# Patient Record
Sex: FEMALE | Race: WHITE | NOT HISPANIC OR LATINO | Employment: OTHER | URBAN - METROPOLITAN AREA
[De-identification: names, ages, dates, MRNs, and addresses within clinical notes are randomized per-mention and may not be internally consistent; named-entity substitution may affect disease eponyms.]

---

## 2017-01-24 ENCOUNTER — ALLSCRIPTS OFFICE VISIT (OUTPATIENT)
Dept: OTHER | Facility: OTHER | Age: 57
End: 2017-01-24

## 2017-01-24 DIAGNOSIS — I10 ESSENTIAL (PRIMARY) HYPERTENSION: ICD-10-CM

## 2017-01-24 DIAGNOSIS — E55.9 VITAMIN D DEFICIENCY: ICD-10-CM

## 2018-01-11 NOTE — PROGRESS NOTES
Assessment    1  Well adult on routine health check (V70 0) (Z00 00)    Plan  Asthma    · Montelukast Sodium 10 MG Oral Tablet; Take 1 tablet by mouth at bedtime  Hypertension    · HydroCHLOROthiazide 12 5 MG Oral Capsule; TAKE 1 CAPSULE DAILY   · (1) COMPREHENSIVE METABOLIC PANEL; Status:Active; Requested FQX:[id number];    · (1) LIPID PANEL, FASTING; Status:Active; Requested DPE:[id number];    · (1) TSH; Status:Active; Requested UCR:[id number];   Low vitamin D level    · (1) VITAMIN D 25-HYDROXY; Status:Active; Requested ZGH:[id number];     Discussion/Summary  health maintenance visit Currently, she eats a healthy diet  cervical cancer screening is current Breast cancer screening: the risks and benefits of breast cancer screening were discussed, the patient declines breast cancer screening and DISCUSSED RISK AND BENEFIT-  Osteoporosis screening: the risks and benefits of osteoporosis screening were discussed  The immunizations are up to date  Advice and education were given regarding calcium supplements and vitamin D supplements  Patient discussion: discussed with the patient  OBTAIN LABS  IMM UP TO DATE  REFUSES MAMMOGRAM  FOLLOW UP 6 MONTHS  BP STABLE  LIPIDS STABLE    CHECK VIT D LEVEL- LAST DEXA NORMAL  Patient is able to Self-Care  Chief Complaint  PATIENT HERE FOR ANNUAL EXAM;      History of Present Illness  HM, Adult Female: The patient is being seen for a health maintenance evaluation  The last health maintenance visit was 12 month(s) ago  General Health:   Reproductive health: the patient is postmenopausal    Screening: cancer screening reviewed and current  metabolic screening reviewed and current  risk screening reviewed and current  HPI: PATIENT HERE FOR ANNUAL EXAM;      Review of Systems    Constitutional: No fever, no chills, feels well, no tiredness, no recent weight gain or weight loss, not feeling poorly and not feeling tired     Eyes: No complaints of eye pain, no red eyes, no eyesight problems, no discharge, no dry eyes, no itching of eyes, no eye pain, no eyesight problems and no purulent discharge from the eyes  ENT: no complaints of earache, no loss of hearing, no nose bleeds, no nasal discharge, no sore throat, no hoarseness and no earache  Cardiovascular: No complaints of slow heart rate, no fast heart rate, no chest pain, no palpitations, no leg claudication, no lower extremity edema, no chest pain, no intermittent leg claudication and no palpitations  Respiratory: No complaints of shortness of breath, no wheezing, no cough, no SOB on exertion, no orthopnea, no PND, no cough and no shortness of breath during exertion  Gastrointestinal: No complaints of abdominal pain, no constipation, no nausea or vomiting, no diarrhea, no bloody stools, no abdominal pain, no nausea, no constipation and no diarrhea  Genitourinary: No complaints of dysuria, no incontinence, no pelvic pain, no dysmenorrhea, no vaginal discharge or bleeding  Musculoskeletal: as noted in HPI and no arthralgias  Integumentary: No complaints of skin rash or lesions, no itching, no skin wounds, no breast pain or lump, no rashes and no skin lesions  Neurological: No complaints of headache, no confusion, no convulsions, no numbness, no dizziness or fainting, no tingling, no limb weakness, no difficulty walking, no headache, no confusion and no convulsions  Psychiatric: Not suicidal, no sleep disturbance, no anxiety or depression, no change in personality, no emotional problems and no sleep disturbances  Endocrine: No complaints of proptosis, no hot flashes, no muscle weakness, no deepening of the voice, no feelings of weakness and no proptosis  Hematologic/Lymphatic: No complaints of swollen glands, no swollen glands in the neck, does not bleed easily, does not bruise easily  Active Problems    1  Allergic rhinitis (477 9) (J30 9)   2  Asthma (493 90) (J45 909)   3  Edema (782 3) (R60 9)   4  Follow-up arranged for 6 months   5  Hypertension (401 9) (I10)   6  Left knee tendonitis (727 09) (M17 892)   7  Need for prophylactic vaccination and inoculation against influenza (V04 81) (Z23)   8  Well adult on routine health check (V70 0) (Z00 00)    Past Medical History    · History of Fracture Of The Patella (822 0)   · History of Right groin pain (789 09) (R10 30)    Surgical History    · History of Tonsillectomy    Family History  Mother    · Family history of Echo Mitral Valve Systolic Bileaflet Prolapse  Father    · Family history of Echo Mitral Valve Systolic Bileaflet Prolapse  Maternal Grandmother    · Family history of Hypertension (V17 49)    Social History    · Never A Smoker    Current Meds   1  Allegra Allergy 60 MG Oral Tablet; Take 1 tablet daily Recorded   2  Asmanex  MCG/ACT Inhalation Aerosol; Therapy: (Recorded:12Apr2016) to Recorded   3  B Complex Vitamins CAPS; Therapy: (Recorded:04Apr2013) to Recorded   4  Fish Oil OIL; Therapy: 68QTP7706 to Recorded   5  HydroCHLOROthiazide 12 5 MG Oral Capsule; TAKE 1 CAPSULE DAILY; Therapy: 14KSC5394 to (Evaluate:88Kmv5412)  Requested for: 46ZFQ6308; Last   Rx:71Crg6401 Ordered   6  Montelukast Sodium 10 MG Oral Tablet; Take 1 tablet by mouth at bedtime; Therapy: 91INN7875 to (Evaluate:95Kvm3257)  Requested for: 02VRO2651; Last   Rx:[id number] Ordered   7  ProAir  (90 Base) MCG/ACT Inhalation Aerosol Solution; INHALE 2 PUFFS   FOUR TIMES DAILY AS DIRECTED; Therapy: 76WFK7820 to (Evaluate:30Mar2017)  Requested for: 04Apr2016; Last   Rx:[id number] Ordered   8  Vitamin D TABS; Therapy: (Recorded:04Apr2013) to Recorded    Allergies    1  Serevent AERS    2  Latex   3   Banana    Vitals   Recorded: 36HID8352 01:03PM   Temperature 98 F   Heart Rate 74   Respiration 16   Systolic 893   Diastolic 72   Height 5 ft 7 in   Weight 228 lb 6 4 oz   BMI Calculated 35 77   BSA Calculated 2 14     Physical Exam    Constitutional   General appearance: No acute distress, well appearing and well nourished  WDWN FEMALE IN NAD  Eyes   Conjunctiva and lids: No swelling, erythema or discharge  Pupils and irises: Equal, round, reactive to light  Ears, Nose, Mouth, and Throat   External inspection of ears and nose: Normal     Otoscopic examination: Tympanic membranes translucent with normal light reflex  Canals patent without erythema  Hearing: Normal     Nasal mucosa, septum, and turbinates: Normal without edema or erythema  Lips, teeth, and gums: Normal, good dentition  Oropharynx: Normal with no erythema, edema, exudate or lesions  Inspection of the oropharynx showed fully visible tonsils, uvula and soft palate (Mallampati class 1)  Oral mucosa was moist, but was normal  The palate examination showed no abnormalities  The tongue was normal  The tonsils were normal    Neck   Neck: Supple, symmetric, trachea midline, no masses  Thyroid: Normal, no thyromegaly  NO NODULES  Pulmonary   Respiratory effort: No increased work of breathing or signs of respiratory distress  Percussion of chest: Normal     Auscultation of lungs: Clear to auscultation  Auscultation of the lungs revealed no expiratory wheezing, normal expiratory time and no inspiratory wheezing  no rales or crackles were heard bilaterally  no rhonchi  no friction rub  no wheezing  no diminished breath sounds  no bronchial breath sounds  Cardiovascular   Palpation of heart: Normal PMI, no thrills  Auscultation of heart: Normal rate and rhythm, normal S1 and S2, no murmurs  The heart rate was normal  The rhythm was regular  Heart sounds: normal S1, normal S2, no S3 and no S4  no murmurs were heard  Abdomen   Abdomen: Non-tender, no masses  Liver and spleen: No hepatomegaly or splenomegaly  Lymphatic   Palpation of lymph nodes in neck: No lymphadenopathy  Musculoskeletal   Gait and station: Normal     Digits and nails: Normal without clubbing or cyanosis  Skin   Palpation of skin and subcutaneous tissue: Normal turgor  Neurologic   Cranial nerves: Cranial nerves II-XII intact  Cortical function: Normal mental status  Reflexes: 2+ and symmetric  Sensation: No sensory loss  Coordination: Normal finger to nose and heel to shin  Psychiatric   Judgment and insight: Normal     Orientation to person, place, and time: Normal     Recent and remote memory: Intact  Mood and affect: Normal        Results/Data  PHQ-2 Adult Depression Screening 24Jan2017 01:07PM User, s     Test Name Result Flag Reference   PHQ-2 Adult Depression Score 0     Over the last two weeks, how often have you been bothered by any of the following problems? Little interest or pleasure in doing things: Not at all - 0  Feeling down, depressed, or hopeless: Not at all - 0   PHQ-2 Adult Depression Screening Negative         Signatures   Electronically signed by :  Dianelys Plunkett DO; Jan 24 2017  1:41PM EST                       (Author)

## 2018-01-14 VITALS
DIASTOLIC BLOOD PRESSURE: 72 MMHG | HEART RATE: 74 BPM | BODY MASS INDEX: 35.85 KG/M2 | WEIGHT: 228.4 LBS | HEIGHT: 67 IN | SYSTOLIC BLOOD PRESSURE: 110 MMHG | RESPIRATION RATE: 16 BRPM | TEMPERATURE: 98 F

## 2018-01-16 ENCOUNTER — ALLSCRIPTS OFFICE VISIT (OUTPATIENT)
Dept: OTHER | Facility: OTHER | Age: 58
End: 2018-01-16

## 2018-01-16 DIAGNOSIS — I10 ESSENTIAL (PRIMARY) HYPERTENSION: ICD-10-CM

## 2018-01-16 DIAGNOSIS — R60.9 EDEMA: ICD-10-CM

## 2018-01-16 DIAGNOSIS — E55.9 VITAMIN D DEFICIENCY: ICD-10-CM

## 2018-01-23 NOTE — PROGRESS NOTES
Assessment    1  Well adult on routine health check (V70 0) (Z00 00)   2  Vitamin D deficiency (268 9) (E55 9)   3  Hypertension (401 9) (I10)   4  Edema (782 3) (R60 9)   5  Excessive cerumen in right ear canal (380 4) (H61 21)    Plan  Edema    · (1) CBC/PLT/DIFF; Status:Active; Requested BVY:[id number];    · (1) COMPREHENSIVE METABOLIC PANEL; Status:Active; Requested UUS:[id number];    · (1) TSH; Status:Active; Requested AAY:[id number];   Edema, Hypertension    · (1) LIPID PANEL, FASTING; Status:Active; Requested HRJ:[id number];   Edema, Vitamin D deficiency    · (1) VITAMIN D 25-HYDROXY; Status:Active; Requested GYJ:[id number]; Excessive cerumen in right ear canal    · Removal Impacted Cerumen Using Irrigation / Lavage one or both ears - POC;  Status:Complete;   Done: 99EGH4930  Hypertension, Low vitamin D level    · (1) MAGNESIUM; Status:Active; Requested TAB:[id number];    · Follow-up visit in 6 months Evaluation and Treatment  Follow-up  Status: Hold For -  Scheduling  Requested for: 31OLL6954    Discussion/Summary  health maintenance visit healthy adult female Currently, she eats a healthy diet  the patient declines cervical cancer screening Breast cancer screening: the patient declines breast cancer screening  Colorectal cancer screening: the patient declines colorectal cancer screening and PT REFUSES  Screening lab work includes glucose, lipid profile, thyroid function testing and 25-hydroxyvitamin D  The immunizations are up to date  Advice and education were given regarding aerobic exercise, calcium supplements and vitamin D supplements  Patient discussion: discussed with the patient  Hepatitis C Screening: the patient was counseled on Hepatitis C screening  The patient declines Hepatitis C screening       OBTAIN LABS FOLLOW UP IN 6 MONTHS   BP STABLE  OCC ECTOPY - NO OTHER SX  REFUSES MAMMO AND COLONOSCOPY  CERUEMN REMOVED RIHGT EAR   FOLLOW UP 6 MONTHS  The patient was counseled regarding diagnostic results, instructions for management, risk factor reductions, prognosis, patient and family education, impressions, risks and benefits of treatment options, importance of compliance with treatment  Chief Complaint  PATIENT HERE FOR ANNUAL EXAM  SHE FEELS WELL        History of Present Illness  HM, Adult Female: The patient is being seen for a health maintenance evaluation  The last health maintenance visit was 12 month(s) ago  General Health: She has regular dental visits  She denies vision problems  Immunizations status: up to date  Lifestyle:  She consumes a diverse and healthy diet  She does not have any weight concerns  She exercises regularly  She does not use tobacco  She denies alcohol use  She denies drug use  Reproductive health: the patient is postmenopausal    Screening: cancer screening reviewed and current  metabolic screening reviewed and current  risk screening reviewed and current  HPI: PATIENT HERE FOR ANNUAL EXAM;   REFUSES MAMMOGRAM AND COLONOSCOPY   Colorectal Cancer Screening Prevention Pathway: The patient is being seen for PT REFUSES colorectal cancer screening  The patient is currently asymptomatic  Review of Systems    Constitutional: No fever, no chills, feels well, no tiredness, no recent weight gain or weight loss, not feeling poorly and not feeling tired  Eyes: No complaints of eye pain, no red eyes, no eyesight problems, no discharge, no dry eyes, no itching of eyes, no eye pain, no eyesight problems and no purulent discharge from the eyes  ENT: no complaints of earache, no loss of hearing, no nose bleeds, no nasal discharge, no sore throat, no hoarseness, no earache, no nosebleeds, no sore throat, no nasal discharge and no hoarseness     Cardiovascular: No complaints of slow heart rate, no fast heart rate, no chest pain, no palpitations, no leg claudication, no lower extremity edema, the heart rate was not slow, no chest pain, no intermittent leg claudication, the heart rate was not fast and no palpitations  Respiratory: No complaints of shortness of breath, no wheezing, no cough, no SOB on exertion, no orthopnea, no PND, no shortness of breath, no cough, no wheezing and no shortness of breath during exertion  Gastrointestinal: No complaints of abdominal pain, no constipation, no nausea or vomiting, no diarrhea, no bloody stools, no abdominal pain, no nausea, no constipation and no diarrhea  Genitourinary: No complaints of dysuria, no incontinence, no pelvic pain, no dysmenorrhea, no vaginal discharge or bleeding, no dysuria, no pelvic pain and no incontinence  Musculoskeletal: as noted in HPI and no arthralgias  Integumentary: No complaints of skin rash or lesions, no itching, no skin wounds, no breast pain or lump, no rashes and no skin lesions  Neurological: No complaints of headache, no confusion, no convulsions, no numbness, no dizziness or fainting, no tingling, no limb weakness, no difficulty walking, no headache, no confusion and no convulsions  Psychiatric: Not suicidal, no sleep disturbance, no anxiety or depression, no change in personality, no emotional problems and no sleep disturbances  Endocrine: No complaints of proptosis, no hot flashes, no muscle weakness, no deepening of the voice, no feelings of weakness and no proptosis  Hematologic/Lymphatic: No complaints of swollen glands, no swollen glands in the neck, does not bleed easily, does not bruise easily  ROS reviewed  Active Problems    1  Allergic rhinitis (477 9) (J30 9)   2  Asthma (493 90) (J45 909)   3  Edema (782 3) (R60 9)   4  Follow-up arranged for 6 months   5  Hypertension (401 9) (I10)   6  Left knee tendonitis (727 09) (M76 892)   7  Low vitamin D level (268 9) (E55 9)   8  Need for prophylactic vaccination and inoculation against influenza (V04 81) (Z23)   9  Vitamin D deficiency (268 9) (E55 9)   10   Well adult on routine health check (V70 0) (Z00 00)    Past Medical History    · History of Fracture Of The Patella (822 0)   · History of Right groin pain (789 09) (R10 31)    Surgical History    · History of Tonsillectomy    Family History  Mother    · Family history of Echo Mitral Valve Systolic Bileaflet Prolapse  Father    · Family history of Echo Mitral Valve Systolic Bileaflet Prolapse  Maternal Grandmother    · Family history of Hypertension (V17 49)    Social History    · Never A Smoker    Current Meds   1  Allegra Allergy 60 MG Oral Tablet; Take 1 tablet daily Recorded   2  Asmanex  MCG/ACT Inhalation Aerosol; INHALE 2 PUFFS Twice daily    Requested for: 57SIJ7344; Last Rx:15Jun2017 Ordered   3  B Complex Vitamins CAPS; Therapy: (Recorded:04Apr2013) to Recorded   4  Fish Oil OIL; Therapy: 62DKO0618 to Recorded   5  HydroCHLOROthiazide 12 5 MG Oral Capsule; TAKE 1 CAPSULE DAILY; Therapy: 47MPP3260 to (Evaluate:02Aug2018)  Requested for: 63Efa5371; Last   Rx:07Aug2017 Ordered   6  Montelukast Sodium 10 MG Oral Tablet; Take 1 tablet by mouth at bedtime; Therapy: 39UVT7736 to (Evaluate:15Oct2018)  Requested for: 57NCO1498; Last   Rx:20Oct2017 Ordered   7  ProAir  (90 Base) MCG/ACT Inhalation Aerosol Solution; INHALE 2 PUFFS   FOUR TIMES DAILY AS DIRECTED; Therapy: 22IZF1927 to (Evaluate:09Jun2018)  Requested for: 58MPT0221; Last   Rx:14Jun2017 Ordered   8  Vitamin D3 2000 UNIT Oral Tablet; Take 2000iu 4 days week and 4000iu 3 days week; Therapy: (Recorded:16Jan2018) to Recorded    Allergies    1  Serevent AERS    2  Latex   3  Banana    Vitals   Recorded: 22DKF9252 03:02PM   Temperature 97 7 F   Heart Rate 64   Respiration 16   Systolic 231   Diastolic 76   Height 5 ft 6 25 in   Weight 230 lb    BMI Calculated 36 84   BSA Calculated 2 13     Physical Exam    Constitutional   General appearance: No acute distress, well appearing and well nourished  WDWN FEMALE IN NAD  Eyes   Conjunctiva and lids: No swelling, erythema or discharge      Pupils and irises: Equal, round, reactive to light  Ears, Nose, Mouth, and Throat   External inspection of ears and nose: Normal     Otoscopic examination: Tympanic membranes translucent with normal light reflex  Canals patent without erythema  Hearing: Normal   DECEREASED HEARING RIGHT EAR  Nasal mucosa, septum, and turbinates: Normal without edema or erythema  CERYMEN RIGHT EAR=-    Lips, teeth, and gums: Normal, good dentition  Oropharynx: Normal with no erythema, edema, exudate or lesions  Inspection of the oropharynx showed fully visible tonsils, uvula and soft palate (Mallampati class 1)  Oral mucosa was moist, but was normal  The palate examination showed no abnormalities  The tongue was normal  The tonsils were normal    Neck   Neck: Supple, symmetric, trachea midline, no masses  Thyroid: Normal, no thyromegaly  NO NODULES  Pulmonary   Respiratory effort: No increased work of breathing or signs of respiratory distress  Percussion of chest: Normal     Auscultation of lungs: Clear to auscultation  Auscultation of the lungs revealed no expiratory wheezing, normal expiratory time and no inspiratory wheezing  no rales or crackles were heard bilaterally  no rhonchi  no friction rub  no wheezing  no diminished breath sounds  no bronchial breath sounds  Cardiovascular   Palpation of heart: Normal PMI, no thrills  Auscultation of heart: Normal rate and rhythm, normal S1 and S2, no murmurs  The heart rate was normal  The rhythm was regular  Heart sounds: normal S1, normal S2, no S3 and no S4  no murmurs were heard  Abdomen   Abdomen: Non-tender, no masses  Liver and spleen: No hepatomegaly or splenomegaly  Lymphatic   Palpation of lymph nodes in neck: No lymphadenopathy  Musculoskeletal   Gait and station: Normal     Digits and nails: Normal without clubbing or cyanosis  Skin   Palpation of skin and subcutaneous tissue: Normal turgor      Neurologic   Cranial nerves: Cranial nerves II-XII intact  Cortical function: Normal mental status  Reflexes: 2+ and symmetric  Sensation: No sensory loss  Coordination: Normal finger to nose and heel to shin  Psychiatric   Judgment and insight: Normal     Orientation to person, place, and time: Normal     Recent and remote memory: Intact  Mood and affect: Normal        Results/Data  PHQ-2 Adult Depression Screening 22QWB6514 03:05PM User, Ahs     Test Name Result Flag Reference   PHQ-2 Adult Depression Score 0     Over the last two weeks, how often have you been bothered by any of the following problems? Little interest or pleasure in doing things: Not at all - 0  Feeling down, depressed, or hopeless: Not at all - 0   PHQ-2 Adult Depression Screening Negative         Procedure    Procedure: cerumen removal  ear cleaning due to otorrhea  Indication: tympanic membrane(s) could not be visualized in the right ear  Prep: hydrogen peroxide was placed in the canal prior to the procedure  Procedure Note: The procedure was performed by the Provider  The ear was cleaned by using warm water irrigation  The procedure was successful  Post-Procedure:   Patient Status: the patient tolerated the procedure well  Complications: there were no complications  Signatures   Electronically signed by :  Dianelys Plunkett DO; Jan 16 2018  3:41PM EST                       (Author)

## 2018-01-24 VITALS
HEART RATE: 64 BPM | RESPIRATION RATE: 16 BRPM | BODY MASS INDEX: 36.96 KG/M2 | SYSTOLIC BLOOD PRESSURE: 118 MMHG | WEIGHT: 230 LBS | DIASTOLIC BLOOD PRESSURE: 76 MMHG | TEMPERATURE: 97.7 F | HEIGHT: 66 IN

## 2018-03-29 DIAGNOSIS — J45.909 UNCOMPLICATED ASTHMA, UNSPECIFIED ASTHMA SEVERITY, UNSPECIFIED WHETHER PERSISTENT: Primary | ICD-10-CM

## 2018-05-22 ENCOUNTER — APPOINTMENT (OUTPATIENT)
Dept: LAB | Facility: CLINIC | Age: 58
End: 2018-05-22
Payer: COMMERCIAL

## 2018-05-22 DIAGNOSIS — I10 ESSENTIAL (PRIMARY) HYPERTENSION: ICD-10-CM

## 2018-05-22 DIAGNOSIS — E55.9 VITAMIN D DEFICIENCY: ICD-10-CM

## 2018-05-22 DIAGNOSIS — R60.9 EDEMA: ICD-10-CM

## 2018-05-22 LAB
25(OH)D3 SERPL-MCNC: 23.2 NG/ML (ref 30–100)
ALBUMIN SERPL BCP-MCNC: 3.6 G/DL (ref 3.5–5)
ALP SERPL-CCNC: 59 U/L (ref 46–116)
ALT SERPL W P-5'-P-CCNC: 41 U/L (ref 12–78)
ANION GAP SERPL CALCULATED.3IONS-SCNC: 4 MMOL/L (ref 4–13)
AST SERPL W P-5'-P-CCNC: 22 U/L (ref 5–45)
BASOPHILS # BLD AUTO: 0.02 THOUSANDS/ΜL (ref 0–0.1)
BASOPHILS NFR BLD AUTO: 0 % (ref 0–1)
BILIRUB SERPL-MCNC: 0.79 MG/DL (ref 0.2–1)
BUN SERPL-MCNC: 23 MG/DL (ref 5–25)
CALCIUM SERPL-MCNC: 9.4 MG/DL (ref 8.3–10.1)
CHLORIDE SERPL-SCNC: 105 MMOL/L (ref 100–108)
CHOLEST SERPL-MCNC: 220 MG/DL (ref 50–200)
CO2 SERPL-SCNC: 29 MMOL/L (ref 21–32)
CREAT SERPL-MCNC: 0.94 MG/DL (ref 0.6–1.3)
EOSINOPHIL # BLD AUTO: 0.13 THOUSAND/ΜL (ref 0–0.61)
EOSINOPHIL NFR BLD AUTO: 2 % (ref 0–6)
ERYTHROCYTE [DISTWIDTH] IN BLOOD BY AUTOMATED COUNT: 14.8 % (ref 11.6–15.1)
GFR SERPL CREATININE-BSD FRML MDRD: 68 ML/MIN/1.73SQ M
GLUCOSE P FAST SERPL-MCNC: 94 MG/DL (ref 65–99)
HCT VFR BLD AUTO: 44.3 % (ref 34.8–46.1)
HDLC SERPL-MCNC: 51 MG/DL (ref 40–60)
HGB BLD-MCNC: 14.1 G/DL (ref 11.5–15.4)
LDLC SERPL CALC-MCNC: 138 MG/DL (ref 0–100)
LYMPHOCYTES # BLD AUTO: 2.46 THOUSANDS/ΜL (ref 0.6–4.47)
LYMPHOCYTES NFR BLD AUTO: 41 % (ref 14–44)
MAGNESIUM SERPL-MCNC: 2.4 MG/DL (ref 1.6–2.6)
MCH RBC QN AUTO: 31.4 PG (ref 26.8–34.3)
MCHC RBC AUTO-ENTMCNC: 31.8 G/DL (ref 31.4–37.4)
MCV RBC AUTO: 99 FL (ref 82–98)
MONOCYTES # BLD AUTO: 0.49 THOUSAND/ΜL (ref 0.17–1.22)
MONOCYTES NFR BLD AUTO: 8 % (ref 4–12)
NEUTROPHILS # BLD AUTO: 2.96 THOUSANDS/ΜL (ref 1.85–7.62)
NEUTS SEG NFR BLD AUTO: 49 % (ref 43–75)
NONHDLC SERPL-MCNC: 169 MG/DL
NRBC BLD AUTO-RTO: 0 /100 WBCS
PLATELET # BLD AUTO: 309 THOUSANDS/UL (ref 149–390)
PMV BLD AUTO: 10.2 FL (ref 8.9–12.7)
POTASSIUM SERPL-SCNC: 3.8 MMOL/L (ref 3.5–5.3)
PROT SERPL-MCNC: 7.3 G/DL (ref 6.4–8.2)
RBC # BLD AUTO: 4.49 MILLION/UL (ref 3.81–5.12)
SODIUM SERPL-SCNC: 138 MMOL/L (ref 136–145)
TRIGL SERPL-MCNC: 154 MG/DL
TSH SERPL DL<=0.05 MIU/L-ACNC: 2.19 UIU/ML (ref 0.36–3.74)
WBC # BLD AUTO: 6.07 THOUSAND/UL (ref 4.31–10.16)

## 2018-05-22 PROCEDURE — 83735 ASSAY OF MAGNESIUM: CPT

## 2018-05-22 PROCEDURE — 80061 LIPID PANEL: CPT

## 2018-05-22 PROCEDURE — 80053 COMPREHEN METABOLIC PANEL: CPT

## 2018-05-22 PROCEDURE — 84443 ASSAY THYROID STIM HORMONE: CPT

## 2018-05-22 PROCEDURE — 82306 VITAMIN D 25 HYDROXY: CPT

## 2018-05-22 PROCEDURE — 36415 COLL VENOUS BLD VENIPUNCTURE: CPT

## 2018-05-22 PROCEDURE — 85025 COMPLETE CBC W/AUTO DIFF WBC: CPT

## 2018-05-29 ENCOUNTER — TELEPHONE (OUTPATIENT)
Dept: FAMILY MEDICINE CLINIC | Facility: CLINIC | Age: 58
End: 2018-05-29

## 2018-05-29 NOTE — TELEPHONE ENCOUNTER
PATIENT RECEIVED LAB RESULTS IN THE MAIL  INSTRUCTIONS SAID TO TAKE VITAMIN D 2000 IU DAILY  PATIENT SAID SHE IS CURRENTLY TAKING 2000 IU X 4 DAYS & 4000 IU X 3 DAY  SHE IS WONDERING IF SHE START TAKING A RX DOSE OF VITAMIN D? OR JUST KEEP INCREASING HER OTC DOSE? [facility] [address]

## 2018-06-05 DIAGNOSIS — J45.909 UNCOMPLICATED ASTHMA, UNSPECIFIED ASTHMA SEVERITY, UNSPECIFIED WHETHER PERSISTENT: ICD-10-CM

## 2018-06-06 DIAGNOSIS — J45.909 UNCOMPLICATED ASTHMA, UNSPECIFIED ASTHMA SEVERITY, UNSPECIFIED WHETHER PERSISTENT: ICD-10-CM

## 2018-06-06 RX ORDER — MOMETASONE FUROATE 220 UG/1
2 INHALANT RESPIRATORY (INHALATION) DAILY
Qty: 1 INHALER | Refills: 3 | Status: SHIPPED | OUTPATIENT
Start: 2018-06-06 | End: 2018-08-13 | Stop reason: SDUPTHER

## 2018-07-27 DIAGNOSIS — I10 ESSENTIAL HYPERTENSION: Primary | ICD-10-CM

## 2018-07-27 RX ORDER — HYDROCHLOROTHIAZIDE 12.5 MG/1
CAPSULE, GELATIN COATED ORAL
Qty: 90 CAPSULE | Refills: 3 | Status: SHIPPED | OUTPATIENT
Start: 2018-07-27 | End: 2019-07-08 | Stop reason: SDUPTHER

## 2018-08-13 ENCOUNTER — OFFICE VISIT (OUTPATIENT)
Dept: FAMILY MEDICINE CLINIC | Facility: CLINIC | Age: 58
End: 2018-08-13
Payer: COMMERCIAL

## 2018-08-13 VITALS
DIASTOLIC BLOOD PRESSURE: 82 MMHG | TEMPERATURE: 98.6 F | HEIGHT: 67 IN | HEART RATE: 76 BPM | SYSTOLIC BLOOD PRESSURE: 124 MMHG | WEIGHT: 230 LBS | BODY MASS INDEX: 36.1 KG/M2

## 2018-08-13 DIAGNOSIS — J45.20 MILD INTERMITTENT REACTIVE AIRWAY DISEASE WITHOUT COMPLICATION: ICD-10-CM

## 2018-08-13 DIAGNOSIS — I10 ESSENTIAL HYPERTENSION: Primary | ICD-10-CM

## 2018-08-13 DIAGNOSIS — J45.909 UNCOMPLICATED ASTHMA, UNSPECIFIED ASTHMA SEVERITY, UNSPECIFIED WHETHER PERSISTENT: ICD-10-CM

## 2018-08-13 DIAGNOSIS — E55.9 VITAMIN D DEFICIENCY: ICD-10-CM

## 2018-08-13 PROCEDURE — 3008F BODY MASS INDEX DOCD: CPT | Performed by: INTERNAL MEDICINE

## 2018-08-13 PROCEDURE — 3079F DIAST BP 80-89 MM HG: CPT | Performed by: INTERNAL MEDICINE

## 2018-08-13 PROCEDURE — 1036F TOBACCO NON-USER: CPT | Performed by: INTERNAL MEDICINE

## 2018-08-13 PROCEDURE — 99213 OFFICE O/P EST LOW 20 MIN: CPT | Performed by: INTERNAL MEDICINE

## 2018-08-13 PROCEDURE — 3074F SYST BP LT 130 MM HG: CPT | Performed by: INTERNAL MEDICINE

## 2018-08-13 RX ORDER — ALBUTEROL SULFATE 90 UG/1
2 AEROSOL, METERED RESPIRATORY (INHALATION) 4 TIMES DAILY
COMMUNITY
Start: 2011-05-26 | End: 2018-11-29 | Stop reason: SDUPTHER

## 2018-08-13 RX ORDER — CHLORAL HYDRATE 500 MG
1 CAPSULE ORAL DAILY
COMMUNITY
Start: 2013-04-04

## 2018-08-13 RX ORDER — FEXOFENADINE HYDROCHLORIDE 60 MG/1
1 TABLET, FILM COATED ORAL DAILY
COMMUNITY

## 2018-08-13 RX ORDER — MONTELUKAST SODIUM 10 MG/1
1 TABLET ORAL
COMMUNITY
Start: 2014-04-07 | End: 2018-11-03 | Stop reason: SDUPTHER

## 2018-08-13 RX ORDER — VITAMIN B COMPLEX
1 CAPSULE ORAL DAILY
COMMUNITY

## 2018-08-13 RX ORDER — ACETAMINOPHEN 160 MG
5000 TABLET,DISINTEGRATING ORAL DAILY
COMMUNITY

## 2018-08-13 NOTE — PROGRESS NOTES
ASSESSMENT and PLAN:  Manav López is a 62 y o  female with:   Problem List Items Addressed This Visit     Hypertension - Primary     Stable bp  Stable labs  Check labs in 6 months          Relevant Orders    Comprehensive metabolic panel    Lipid panel    Vitamin D deficiency     Patient taking vitamin d otc  Follow up in 6-9 months          Relevant Orders    Vitamin D 25 hydroxy    Reactive airway disease without complication     Continue current meds  Follow up                SUBJECTIVE:  Manav López is a 62 y o  female who presents today with a chief complaint of Hypertension (4 month follow up) and Asthma    Patient here for follow up; Her bp is stable  She refuses mammogram and colonoscopy  Review of Systems   Constitutional: Negative  HENT: Negative  Eyes: Negative  Respiratory: Negative  Cardiovascular: Negative  Gastrointestinal: Negative  Endocrine: Negative  Genitourinary: Negative  Musculoskeletal: Negative  Skin: Negative  Allergic/Immunologic: Negative  Neurological: Negative  Hematological: Negative  Psychiatric/Behavioral: Negative  I have reviewed the patient's PMH, Social History, Medication List and Allergies  OBJECTIVE:  /82 (BP Location: Left arm, Patient Position: Sitting, Cuff Size: Large)   Pulse 76   Temp 98 6 °F (37 °C)   Ht 5' 6 75" (1 695 m)   Wt 104 kg (230 lb)   Breastfeeding? No   BMI 36 29 kg/m²   Physical Exam   Constitutional: She is oriented to person, place, and time  She appears well-developed and well-nourished  HENT:   Head: Normocephalic and atraumatic  Right Ear: External ear normal    Left Ear: External ear normal    Nose: Nose normal    Mouth/Throat: Oropharynx is clear and moist    Eyes: Conjunctivae and EOM are normal  Pupils are equal, round, and reactive to light  Neck: Normal range of motion  Neck supple  No JVD present  No tracheal deviation present  No thyromegaly present  Cardiovascular: Normal rate, regular rhythm, normal heart sounds and intact distal pulses  No murmur heard  Pulmonary/Chest: Effort normal and breath sounds normal  No respiratory distress  She has no wheezes  Abdominal: Soft  Bowel sounds are normal  She exhibits no distension  There is no tenderness  Musculoskeletal: Normal range of motion  She exhibits no edema, tenderness or deformity  Neurological: She is alert and oriented to person, place, and time  She has normal reflexes  No cranial nerve deficit  Coordination normal    Skin: Skin is warm and dry  No rash noted  No erythema  Right great toe with loss of nail, deformity of nail and dry skin lateral edge    Psychiatric: She has a normal mood and affect  Her behavior is normal  Thought content normal    Nursing note and vitals reviewed

## 2018-08-13 NOTE — PATIENT INSTRUCTIONS
Add clotrimazole and kendell aid to right great toe- put on at night- slather it on-  Will take 3 weeks   Labs in march or april

## 2018-11-03 DIAGNOSIS — J45.909 ASTHMA, UNSPECIFIED ASTHMA SEVERITY, UNSPECIFIED WHETHER COMPLICATED, UNSPECIFIED WHETHER PERSISTENT: Primary | ICD-10-CM

## 2018-11-03 RX ORDER — MONTELUKAST SODIUM 10 MG/1
TABLET ORAL
Qty: 90 TABLET | Refills: 1 | Status: SHIPPED | OUTPATIENT
Start: 2018-11-03 | End: 2019-04-28 | Stop reason: SDUPTHER

## 2018-11-15 ENCOUNTER — IMMUNIZATION (OUTPATIENT)
Dept: FAMILY MEDICINE CLINIC | Facility: CLINIC | Age: 58
End: 2018-11-15
Payer: COMMERCIAL

## 2018-11-15 DIAGNOSIS — Z23 NEED FOR IMMUNIZATION AGAINST INFLUENZA: Primary | ICD-10-CM

## 2018-11-15 PROCEDURE — 90682 RIV4 VACC RECOMBINANT DNA IM: CPT | Performed by: FAMILY MEDICINE

## 2018-11-15 PROCEDURE — 90471 IMMUNIZATION ADMIN: CPT | Performed by: FAMILY MEDICINE

## 2018-11-29 DIAGNOSIS — J45.20 MILD INTERMITTENT ASTHMA WITHOUT COMPLICATION: Primary | ICD-10-CM

## 2018-11-29 RX ORDER — ALBUTEROL SULFATE 90 UG/1
2 AEROSOL, METERED RESPIRATORY (INHALATION) 4 TIMES DAILY PRN
Qty: 1 INHALER | Refills: 5 | Status: SHIPPED | OUTPATIENT
Start: 2018-11-29 | End: 2019-12-02 | Stop reason: SDUPTHER

## 2019-01-25 ENCOUNTER — TELEPHONE (OUTPATIENT)
Dept: FAMILY MEDICINE CLINIC | Facility: CLINIC | Age: 59
End: 2019-01-25

## 2019-01-25 DIAGNOSIS — J06.9 UPPER RESPIRATORY TRACT INFECTION, UNSPECIFIED TYPE: Primary | ICD-10-CM

## 2019-01-25 RX ORDER — AZITHROMYCIN 250 MG/1
TABLET, FILM COATED ORAL
Qty: 6 TABLET | Refills: 0 | Status: SHIPPED | OUTPATIENT
Start: 2019-01-25 | End: 2019-01-29

## 2019-01-25 NOTE — TELEPHONE ENCOUNTER
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

## 2019-04-22 ENCOUNTER — APPOINTMENT (OUTPATIENT)
Dept: LAB | Facility: CLINIC | Age: 59
End: 2019-04-22
Payer: COMMERCIAL

## 2019-04-22 DIAGNOSIS — E55.9 VITAMIN D DEFICIENCY: ICD-10-CM

## 2019-04-22 DIAGNOSIS — I10 ESSENTIAL HYPERTENSION: ICD-10-CM

## 2019-04-22 LAB
25(OH)D3 SERPL-MCNC: 36.7 NG/ML (ref 30–100)
ALBUMIN SERPL BCP-MCNC: 3.6 G/DL (ref 3.5–5)
ALP SERPL-CCNC: 62 U/L (ref 46–116)
ALT SERPL W P-5'-P-CCNC: 43 U/L (ref 12–78)
ANION GAP SERPL CALCULATED.3IONS-SCNC: 6 MMOL/L (ref 4–13)
AST SERPL W P-5'-P-CCNC: 22 U/L (ref 5–45)
BILIRUB SERPL-MCNC: 0.89 MG/DL (ref 0.2–1)
BUN SERPL-MCNC: 24 MG/DL (ref 5–25)
CALCIUM SERPL-MCNC: 9.4 MG/DL (ref 8.3–10.1)
CHLORIDE SERPL-SCNC: 104 MMOL/L (ref 100–108)
CHOLEST SERPL-MCNC: 233 MG/DL (ref 50–200)
CO2 SERPL-SCNC: 27 MMOL/L (ref 21–32)
CREAT SERPL-MCNC: 0.98 MG/DL (ref 0.6–1.3)
GFR SERPL CREATININE-BSD FRML MDRD: 64 ML/MIN/1.73SQ M
GLUCOSE P FAST SERPL-MCNC: 87 MG/DL (ref 65–99)
HDLC SERPL-MCNC: 50 MG/DL (ref 40–60)
LDLC SERPL CALC-MCNC: 148 MG/DL (ref 0–100)
NONHDLC SERPL-MCNC: 183 MG/DL
POTASSIUM SERPL-SCNC: 3.8 MMOL/L (ref 3.5–5.3)
PROT SERPL-MCNC: 7.3 G/DL (ref 6.4–8.2)
SODIUM SERPL-SCNC: 137 MMOL/L (ref 136–145)
TRIGL SERPL-MCNC: 173 MG/DL

## 2019-04-22 PROCEDURE — 80061 LIPID PANEL: CPT

## 2019-04-22 PROCEDURE — 36415 COLL VENOUS BLD VENIPUNCTURE: CPT

## 2019-04-22 PROCEDURE — 82306 VITAMIN D 25 HYDROXY: CPT

## 2019-04-22 PROCEDURE — 80053 COMPREHEN METABOLIC PANEL: CPT

## 2019-04-28 DIAGNOSIS — J45.909 ASTHMA, UNSPECIFIED ASTHMA SEVERITY, UNSPECIFIED WHETHER COMPLICATED, UNSPECIFIED WHETHER PERSISTENT: ICD-10-CM

## 2019-04-28 RX ORDER — MONTELUKAST SODIUM 10 MG/1
TABLET ORAL
Qty: 30 TABLET | Refills: 0 | Status: SHIPPED | OUTPATIENT
Start: 2019-04-28 | End: 2019-05-28 | Stop reason: SDUPTHER

## 2019-05-07 ENCOUNTER — OFFICE VISIT (OUTPATIENT)
Dept: FAMILY MEDICINE CLINIC | Facility: CLINIC | Age: 59
End: 2019-05-07
Payer: COMMERCIAL

## 2019-05-07 VITALS
HEIGHT: 67 IN | DIASTOLIC BLOOD PRESSURE: 82 MMHG | SYSTOLIC BLOOD PRESSURE: 122 MMHG | BODY MASS INDEX: 36.1 KG/M2 | OXYGEN SATURATION: 97 % | WEIGHT: 230 LBS | RESPIRATION RATE: 17 BRPM | HEART RATE: 74 BPM

## 2019-05-07 DIAGNOSIS — E78.2 MIXED HYPERLIPIDEMIA: ICD-10-CM

## 2019-05-07 DIAGNOSIS — E55.9 VITAMIN D DEFICIENCY: ICD-10-CM

## 2019-05-07 DIAGNOSIS — J30.1 SEASONAL ALLERGIC RHINITIS DUE TO POLLEN: ICD-10-CM

## 2019-05-07 DIAGNOSIS — I10 ESSENTIAL HYPERTENSION: Primary | ICD-10-CM

## 2019-05-07 DIAGNOSIS — J45.40 MODERATE PERSISTENT ASTHMA WITHOUT COMPLICATION: ICD-10-CM

## 2019-05-07 DIAGNOSIS — E66.9 OBESITY (BMI 30-39.9): ICD-10-CM

## 2019-05-07 PROBLEM — J45.909 REACTIVE AIRWAY DISEASE WITHOUT COMPLICATION: Status: RESOLVED | Noted: 2018-08-13 | Resolved: 2019-05-07

## 2019-05-07 PROCEDURE — 3074F SYST BP LT 130 MM HG: CPT | Performed by: FAMILY MEDICINE

## 2019-05-07 PROCEDURE — 1111F DSCHRG MED/CURRENT MED MERGE: CPT | Performed by: FAMILY MEDICINE

## 2019-05-07 PROCEDURE — 3079F DIAST BP 80-89 MM HG: CPT | Performed by: FAMILY MEDICINE

## 2019-05-07 PROCEDURE — 3008F BODY MASS INDEX DOCD: CPT | Performed by: FAMILY MEDICINE

## 2019-05-07 PROCEDURE — 99214 OFFICE O/P EST MOD 30 MIN: CPT | Performed by: FAMILY MEDICINE

## 2019-05-07 PROCEDURE — 1036F TOBACCO NON-USER: CPT | Performed by: FAMILY MEDICINE

## 2019-05-28 DIAGNOSIS — J45.909 ASTHMA, UNSPECIFIED ASTHMA SEVERITY, UNSPECIFIED WHETHER COMPLICATED, UNSPECIFIED WHETHER PERSISTENT: ICD-10-CM

## 2019-05-28 RX ORDER — MONTELUKAST SODIUM 10 MG/1
TABLET ORAL
Qty: 30 TABLET | Refills: 5 | Status: SHIPPED | OUTPATIENT
Start: 2019-05-28 | End: 2019-11-24 | Stop reason: SDUPTHER

## 2019-07-08 DIAGNOSIS — I10 ESSENTIAL HYPERTENSION: ICD-10-CM

## 2019-07-08 RX ORDER — HYDROCHLOROTHIAZIDE 12.5 MG/1
CAPSULE, GELATIN COATED ORAL
Qty: 90 CAPSULE | Refills: 0 | Status: SHIPPED | OUTPATIENT
Start: 2019-07-08 | End: 2019-10-01 | Stop reason: SDUPTHER

## 2019-08-20 DIAGNOSIS — J45.909 UNCOMPLICATED ASTHMA, UNSPECIFIED ASTHMA SEVERITY, UNSPECIFIED WHETHER PERSISTENT: ICD-10-CM

## 2019-10-01 DIAGNOSIS — I10 ESSENTIAL HYPERTENSION: ICD-10-CM

## 2019-10-01 RX ORDER — HYDROCHLOROTHIAZIDE 12.5 MG/1
CAPSULE, GELATIN COATED ORAL
Qty: 90 CAPSULE | Refills: 1 | Status: SHIPPED | OUTPATIENT
Start: 2019-10-01 | End: 2020-03-30

## 2019-11-14 ENCOUNTER — APPOINTMENT (OUTPATIENT)
Dept: LAB | Facility: CLINIC | Age: 59
End: 2019-11-14
Payer: COMMERCIAL

## 2019-11-14 LAB
25(OH)D3 SERPL-MCNC: 30.2 NG/ML (ref 30–100)
ALBUMIN SERPL BCP-MCNC: 3.6 G/DL (ref 3.5–5)
ALP SERPL-CCNC: 66 U/L (ref 46–116)
ALT SERPL W P-5'-P-CCNC: 58 U/L (ref 12–78)
ANION GAP SERPL CALCULATED.3IONS-SCNC: 2 MMOL/L (ref 4–13)
AST SERPL W P-5'-P-CCNC: 52 U/L (ref 5–45)
BASOPHILS # BLD AUTO: 0.04 THOUSANDS/ΜL (ref 0–0.1)
BASOPHILS NFR BLD AUTO: 1 % (ref 0–1)
BILIRUB SERPL-MCNC: 0.82 MG/DL (ref 0.2–1)
BUN SERPL-MCNC: 24 MG/DL (ref 5–25)
CALCIUM SERPL-MCNC: 9.8 MG/DL (ref 8.3–10.1)
CHLORIDE SERPL-SCNC: 106 MMOL/L (ref 100–108)
CHOLEST SERPL-MCNC: 235 MG/DL (ref 50–200)
CO2 SERPL-SCNC: 31 MMOL/L (ref 21–32)
CREAT SERPL-MCNC: 0.94 MG/DL (ref 0.6–1.3)
EOSINOPHIL # BLD AUTO: 0.11 THOUSAND/ΜL (ref 0–0.61)
EOSINOPHIL NFR BLD AUTO: 2 % (ref 0–6)
ERYTHROCYTE [DISTWIDTH] IN BLOOD BY AUTOMATED COUNT: 14.3 % (ref 11.6–15.1)
GFR SERPL CREATININE-BSD FRML MDRD: 67 ML/MIN/1.73SQ M
GLUCOSE P FAST SERPL-MCNC: 95 MG/DL (ref 65–99)
HCT VFR BLD AUTO: 46.6 % (ref 34.8–46.1)
HDLC SERPL-MCNC: 60 MG/DL
HGB BLD-MCNC: 14.8 G/DL (ref 11.5–15.4)
IMM GRANULOCYTES # BLD AUTO: 0.01 THOUSAND/UL (ref 0–0.2)
IMM GRANULOCYTES NFR BLD AUTO: 0 % (ref 0–2)
LDLC SERPL CALC-MCNC: 139 MG/DL (ref 0–100)
LYMPHOCYTES # BLD AUTO: 1.98 THOUSANDS/ΜL (ref 0.6–4.47)
LYMPHOCYTES NFR BLD AUTO: 30 % (ref 14–44)
MCH RBC QN AUTO: 31.8 PG (ref 26.8–34.3)
MCHC RBC AUTO-ENTMCNC: 31.8 G/DL (ref 31.4–37.4)
MCV RBC AUTO: 100 FL (ref 82–98)
MONOCYTES # BLD AUTO: 0.55 THOUSAND/ΜL (ref 0.17–1.22)
MONOCYTES NFR BLD AUTO: 8 % (ref 4–12)
NEUTROPHILS # BLD AUTO: 3.89 THOUSANDS/ΜL (ref 1.85–7.62)
NEUTS SEG NFR BLD AUTO: 59 % (ref 43–75)
NONHDLC SERPL-MCNC: 175 MG/DL
NRBC BLD AUTO-RTO: 0 /100 WBCS
PLATELET # BLD AUTO: 326 THOUSANDS/UL (ref 149–390)
PMV BLD AUTO: 10.3 FL (ref 8.9–12.7)
POTASSIUM SERPL-SCNC: 3.9 MMOL/L (ref 3.5–5.3)
PROT SERPL-MCNC: 7.7 G/DL (ref 6.4–8.2)
RBC # BLD AUTO: 4.66 MILLION/UL (ref 3.81–5.12)
SODIUM SERPL-SCNC: 139 MMOL/L (ref 136–145)
TRIGL SERPL-MCNC: 179 MG/DL
TSH SERPL DL<=0.05 MIU/L-ACNC: 2.53 UIU/ML (ref 0.36–3.74)
WBC # BLD AUTO: 6.58 THOUSAND/UL (ref 4.31–10.16)

## 2019-11-14 PROCEDURE — 85025 COMPLETE CBC W/AUTO DIFF WBC: CPT | Performed by: FAMILY MEDICINE

## 2019-11-14 PROCEDURE — 84443 ASSAY THYROID STIM HORMONE: CPT | Performed by: FAMILY MEDICINE

## 2019-11-14 PROCEDURE — 36415 COLL VENOUS BLD VENIPUNCTURE: CPT | Performed by: FAMILY MEDICINE

## 2019-11-14 PROCEDURE — 82306 VITAMIN D 25 HYDROXY: CPT | Performed by: FAMILY MEDICINE

## 2019-11-14 PROCEDURE — 80053 COMPREHEN METABOLIC PANEL: CPT | Performed by: FAMILY MEDICINE

## 2019-11-14 PROCEDURE — 80061 LIPID PANEL: CPT | Performed by: FAMILY MEDICINE

## 2019-11-24 DIAGNOSIS — J45.909 ASTHMA, UNSPECIFIED ASTHMA SEVERITY, UNSPECIFIED WHETHER COMPLICATED, UNSPECIFIED WHETHER PERSISTENT: ICD-10-CM

## 2019-11-24 RX ORDER — MONTELUKAST SODIUM 10 MG/1
TABLET ORAL
Qty: 90 TABLET | Refills: 1 | Status: SHIPPED | OUTPATIENT
Start: 2019-11-24 | End: 2020-05-18

## 2019-12-01 NOTE — PROGRESS NOTES
Assessment/Plan:    Brown Dyson was seen today for follow-up  Diagnoses and all orders for this visit:    Essential hypertension  -     CBC and differential  -     Comprehensive metabolic panel    Moderate persistent asthma without complication    Mixed hyperlipidemia  -     Lipid panel    Elevated LFTs  -     Hepatic function panel; Future    Vitamin D deficiency  -     Vitamin D 25 hydroxy    Seasonal allergic rhinitis due to pollen    Need for immunization against influenza  -     influenza vaccine, [id number], quadrivalent, recombinant, PF, 0 5 mL, for patients 18 yr+ (FLUBLOK)    Mild intermittent asthma without complication  -     albuterol (PROAIR HFA) 90 mcg/act inhaler; Inhale 2 puffs 4 (four) times a day as needed for wheezing          Continue with current medications  No indication for statin therapy at this time  Flu vaccine today  Repeat LFTs in 4-6 weeks  OV 6 months with labs at that time  She is not interested in a mammogram or colonoscopy  The 10-year ASCVD risk score (Stella Dolan et al , 2013) is: 4%    Values used to calculate the score:      Age: 61 years      Sex: Female      Is Non- : No      Diabetic: No      Tobacco smoker: No      Systolic Blood Pressure: 840 mmHg      Is BP treated: Yes      HDL Cholesterol: 60 mg/dL      Total Cholesterol: 235 mg/dL     Patient ID: Emmanuel Peraza is a 61 y o  female  Hypertension   This is a chronic problem  The current episode started more than 1 year ago  The problem is unchanged  The problem is controlled  Associated symptoms include peripheral edema  Pertinent negatives include no anxiety, blurred vision, chest pain, headaches, malaise/fatigue, neck pain, orthopnea, palpitations, PND, shortness of breath or sweats  Risk factors for coronary artery disease include family history, obesity and post-menopausal state  There are no compliance problems          The following portions of the patient's history were reviewed and updated as appropriate: allergies, current medications, past family history, past medical history, past social history, past surgical history and problem list     Review of Systems   Constitutional: Negative for appetite change, chills, fatigue, fever, malaise/fatigue and unexpected weight change  HENT: Negative for congestion, ear pain, rhinorrhea, sore throat and trouble swallowing  Eyes: Negative for blurred vision and visual disturbance  Respiratory: Negative for cough, shortness of breath and wheezing  Asthma stable on daily Asmanex and Singulair 10 mg daily  She uses as needed ProAir generally once a day no nighttime symptoms   Cardiovascular: Negative for chest pain, palpitations, orthopnea, leg swelling and PND  Gastrointestinal: Negative for abdominal pain, blood in stool, constipation, diarrhea, nausea and vomiting  No prior colonoscopy  11/2019 LFTs normal except for AST 52  Endocrine:        Vitamin-D deficiency on vitamin D 4,000 units daily 11/2019 vitamin-D 30 2  2013 DEXA scan normal     Genitourinary: Negative for difficulty urinating  Musculoskeletal: Negative for arthralgias, myalgias and neck pain  Skin: Negative for rash  Allergic/Immunologic: Positive for environmental allergies  Seasonal allergies on Allegra daily  No prior allergy testing   Neurological: Negative for dizziness, weakness and headaches  Hematological: Negative for adenopathy  Does not bruise/bleed easily  Psychiatric/Behavioral: Negative for dysphoric mood and sleep disturbance  Objective:      /78   Pulse 88   Temp 98 2 °F (36 8 °C)   Resp 16   Ht 5' 6 75" (1 695 m)   Wt 106 kg (233 lb)   BMI 36 77 kg/m²          Physical Exam   Constitutional: She is oriented to person, place, and time  She appears well-developed and well-nourished  No distress  HENT:   Mouth/Throat: Oropharynx is clear and moist and mucous membranes are normal  No oral lesions   Normal dentition  Eyes: Pupils are equal, round, and reactive to light  Conjunctivae and EOM are normal  No scleral icterus  Neck: No JVD present  Carotid bruit is not present  No tracheal deviation present  No thyroid mass and no thyromegaly present  Cardiovascular: Normal rate, regular rhythm and normal heart sounds  Exam reveals no gallop  No murmur heard  Pulmonary/Chest: Effort normal and breath sounds normal  No respiratory distress  She has no wheezes  She has no rales  Abdominal: Soft  Bowel sounds are normal  She exhibits no distension, no abdominal bruit and no mass  There is no hepatosplenomegaly  There is no tenderness  There is no rebound and no guarding  Musculoskeletal: Normal range of motion  She exhibits no edema or deformity  Lymphadenopathy:     She has no cervical adenopathy  Neurological: She is alert and oriented to person, place, and time  She displays normal reflexes  No cranial nerve deficit  Skin: No rash noted  No cyanosis  Nails show no clubbing  Psychiatric: She has a normal mood and affect  Nursing note and vitals reviewed  AST   Date Value Ref Range Status   11/14/2019 52 (H) 5 - 45 U/L Final     Comment:       Specimen collection should occur prior to Sulfasalazine administration due to the potential for falsely depressed results  04/22/2019 22 5 - 45 U/L Final     Comment:       Specimen collection should occur prior to Sulfasalazine administration due to the potential for falsely depressed results  05/22/2018 22 5 - 45 U/L Final     Comment:       Specimen collection should occur prior to Sulfasalazine administration due to the potential for falsely depressed results            Recent Results (from the past 672 hour(s))   Lipid panel    Collection Time: 11/14/19  8:55 AM   Result Value Ref Range    Cholesterol 235 (H) 50 - 200 mg/dL    Triglycerides 179 (H) <=150 mg/dL    HDL, Direct 60 >=40 mg/dL    LDL Calculated 139 (H) 0 - 100 mg/dL    Non-HDL-Chol (CHOL-HDL) 175 mg/dl   Comprehensive metabolic panel    Collection Time: 11/14/19  8:55 AM   Result Value Ref Range    Sodium 139 136 - 145 mmol/L    Potassium 3 9 3 5 - 5 3 mmol/L    Chloride 106 100 - 108 mmol/L    CO2 31 21 - 32 mmol/L    ANION GAP 2 (L) 4 - 13 mmol/L    BUN 24 5 - 25 mg/dL    Creatinine 0 94 0 60 - 1 30 mg/dL    Glucose, Fasting 95 65 - 99 mg/dL    Calcium 9 8 8 3 - 10 1 mg/dL    AST 52 (H) 5 - 45 U/L    ALT 58 12 - 78 U/L    Alkaline Phosphatase 66 46 - 116 U/L    Total Protein 7 7 6 4 - 8 2 g/dL    Albumin 3 6 3 5 - 5 0 g/dL    Total Bilirubin 0 82 0 20 - 1 00 mg/dL    eGFR 67 ml/min/1 73sq m   CBC and differential    Collection Time: 11/14/19  8:55 AM   Result Value Ref Range    WBC 6 58 4 31 - 10 16 Thousand/uL    RBC 4 66 3 81 - 5 12 Million/uL    Hemoglobin 14 8 11 5 - 15 4 g/dL    Hematocrit 46 6 (H) 34 8 - 46 1 %     (H) 82 - 98 fL    MCH 31 8 26 8 - 34 3 pg    MCHC 31 8 31 4 - 37 4 g/dL    RDW 14 3 11 6 - 15 1 %    MPV 10 3 8 9 - 12 7 fL    Platelets 753 540 - 724 Thousands/uL    nRBC 0 /100 WBCs    Neutrophils Relative 59 43 - 75 %    Immat GRANS % 0 0 - 2 %    Lymphocytes Relative 30 14 - 44 %    Monocytes Relative 8 4 - 12 %    Eosinophils Relative 2 0 - 6 %    Basophils Relative 1 0 - 1 %    Neutrophils Absolute 3 89 1 85 - 7 62 Thousands/µL    Immature Grans Absolute 0 01 0 00 - 0 20 Thousand/uL    Lymphocytes Absolute 1 98 0 60 - 4 47 Thousands/µL    Monocytes Absolute 0 55 0 17 - 1 22 Thousand/µL    Eosinophils Absolute 0 11 0 00 - 0 61 Thousand/µL    Basophils Absolute 0 04 0 00 - 0 10 Thousands/µL   TSH, 3rd generation with Free T4 reflex    Collection Time: 11/14/19  8:55 AM   Result Value Ref Range    TSH 3RD GENERATON 2 530 0 358 - 3 740 uIU/mL   Vitamin D 25 hydroxy    Collection Time: 11/14/19  8:55 AM   Result Value Ref Range    Vit D, 25-Hydroxy 30 2 30 0 - 100 0 ng/mL

## 2019-12-02 ENCOUNTER — OFFICE VISIT (OUTPATIENT)
Dept: FAMILY MEDICINE CLINIC | Facility: CLINIC | Age: 59
End: 2019-12-02
Payer: COMMERCIAL

## 2019-12-02 VITALS
DIASTOLIC BLOOD PRESSURE: 78 MMHG | TEMPERATURE: 98.2 F | HEART RATE: 88 BPM | HEIGHT: 67 IN | WEIGHT: 233 LBS | RESPIRATION RATE: 16 BRPM | SYSTOLIC BLOOD PRESSURE: 124 MMHG | BODY MASS INDEX: 36.57 KG/M2

## 2019-12-02 DIAGNOSIS — J45.40 MODERATE PERSISTENT ASTHMA WITHOUT COMPLICATION: ICD-10-CM

## 2019-12-02 DIAGNOSIS — I10 ESSENTIAL HYPERTENSION: Primary | ICD-10-CM

## 2019-12-02 DIAGNOSIS — E55.9 VITAMIN D DEFICIENCY: ICD-10-CM

## 2019-12-02 DIAGNOSIS — Z23 NEED FOR IMMUNIZATION AGAINST INFLUENZA: ICD-10-CM

## 2019-12-02 DIAGNOSIS — E78.2 MIXED HYPERLIPIDEMIA: ICD-10-CM

## 2019-12-02 DIAGNOSIS — R79.89 ELEVATED LFTS: ICD-10-CM

## 2019-12-02 DIAGNOSIS — J45.20 MILD INTERMITTENT ASTHMA WITHOUT COMPLICATION: ICD-10-CM

## 2019-12-02 DIAGNOSIS — J30.1 SEASONAL ALLERGIC RHINITIS DUE TO POLLEN: ICD-10-CM

## 2019-12-02 PROCEDURE — 90682 RIV4 VACC RECOMBINANT DNA IM: CPT

## 2019-12-02 PROCEDURE — 3074F SYST BP LT 130 MM HG: CPT | Performed by: FAMILY MEDICINE

## 2019-12-02 PROCEDURE — 3078F DIAST BP <80 MM HG: CPT | Performed by: FAMILY MEDICINE

## 2019-12-02 PROCEDURE — 1036F TOBACCO NON-USER: CPT | Performed by: FAMILY MEDICINE

## 2019-12-02 PROCEDURE — 90471 IMMUNIZATION ADMIN: CPT

## 2019-12-02 PROCEDURE — 99214 OFFICE O/P EST MOD 30 MIN: CPT | Performed by: FAMILY MEDICINE

## 2019-12-02 PROCEDURE — 3008F BODY MASS INDEX DOCD: CPT | Performed by: FAMILY MEDICINE

## 2019-12-02 RX ORDER — ALBUTEROL SULFATE 90 UG/1
2 AEROSOL, METERED RESPIRATORY (INHALATION) 4 TIMES DAILY PRN
Qty: 1 INHALER | Refills: 5 | Status: SHIPPED | OUTPATIENT
Start: 2019-12-02 | End: 2020-06-15 | Stop reason: SDUPTHER

## 2019-12-08 DIAGNOSIS — J45.20 MILD INTERMITTENT ASTHMA WITHOUT COMPLICATION: ICD-10-CM

## 2019-12-08 RX ORDER — ALBUTEROL SULFATE 90 UG/1
2 AEROSOL, METERED RESPIRATORY (INHALATION) 4 TIMES DAILY PRN
Qty: 8.5 INHALER | Refills: 5 | Status: SHIPPED | OUTPATIENT
Start: 2019-12-08 | End: 2020-12-15 | Stop reason: SDUPTHER

## 2019-12-30 ENCOUNTER — APPOINTMENT (OUTPATIENT)
Dept: LAB | Facility: CLINIC | Age: 59
End: 2019-12-30
Payer: COMMERCIAL

## 2019-12-30 DIAGNOSIS — R79.89 ELEVATED LFTS: ICD-10-CM

## 2019-12-30 LAB
ALBUMIN SERPL BCP-MCNC: 3.4 G/DL (ref 3.5–5)
ALP SERPL-CCNC: 67 U/L (ref 46–116)
ALT SERPL W P-5'-P-CCNC: 52 U/L (ref 12–78)
AST SERPL W P-5'-P-CCNC: 25 U/L (ref 5–45)
BILIRUB DIRECT SERPL-MCNC: 0.14 MG/DL (ref 0–0.2)
BILIRUB SERPL-MCNC: 0.67 MG/DL (ref 0.2–1)
PROT SERPL-MCNC: 7.2 G/DL (ref 6.4–8.2)

## 2019-12-30 PROCEDURE — 80076 HEPATIC FUNCTION PANEL: CPT

## 2019-12-30 PROCEDURE — 36415 COLL VENOUS BLD VENIPUNCTURE: CPT

## 2020-01-04 ENCOUNTER — TELEPHONE (OUTPATIENT)
Dept: FAMILY MEDICINE CLINIC | Facility: CLINIC | Age: 60
End: 2020-01-04

## 2020-01-04 NOTE — TELEPHONE ENCOUNTER
Call re labs  Repeat liver enzymes normal  No additional testing needed at this time       Recent Results (from the past 168 hour(s))   Hepatic function panel    Collection Time: 12/30/19  9:54 AM   Result Value Ref Range    Total Bilirubin 0 67 0 20 - 1 00 mg/dL    Bilirubin, Direct 0 14 0 00 - 0 20 mg/dL    Alkaline Phosphatase 67 46 - 116 U/L    AST 25 5 - 45 U/L    ALT 52 12 - 78 U/L    Total Protein 7 2 6 4 - 8 2 g/dL    Albumin 3 4 (L) 3 5 - 5 0 g/dL

## 2020-03-30 DIAGNOSIS — I10 ESSENTIAL HYPERTENSION: ICD-10-CM

## 2020-03-30 RX ORDER — HYDROCHLOROTHIAZIDE 12.5 MG/1
CAPSULE, GELATIN COATED ORAL
Qty: 90 CAPSULE | Refills: 1 | Status: SHIPPED | OUTPATIENT
Start: 2020-03-30 | End: 2020-09-23

## 2020-05-18 DIAGNOSIS — J45.909 ASTHMA, UNSPECIFIED ASTHMA SEVERITY, UNSPECIFIED WHETHER COMPLICATED, UNSPECIFIED WHETHER PERSISTENT: ICD-10-CM

## 2020-05-18 RX ORDER — MONTELUKAST SODIUM 10 MG/1
TABLET ORAL
Qty: 90 TABLET | Refills: 1 | Status: SHIPPED | OUTPATIENT
Start: 2020-05-18 | End: 2020-11-13

## 2020-06-08 ENCOUNTER — APPOINTMENT (OUTPATIENT)
Dept: LAB | Facility: CLINIC | Age: 60
End: 2020-06-08
Payer: COMMERCIAL

## 2020-06-08 LAB
25(OH)D3 SERPL-MCNC: 32.7 NG/ML (ref 30–100)
ALBUMIN SERPL BCP-MCNC: 3.8 G/DL (ref 3.5–5)
ALP SERPL-CCNC: 65 U/L (ref 46–116)
ALT SERPL W P-5'-P-CCNC: 50 U/L (ref 12–78)
ANION GAP SERPL CALCULATED.3IONS-SCNC: 8 MMOL/L (ref 4–13)
AST SERPL W P-5'-P-CCNC: 24 U/L (ref 5–45)
BASOPHILS # BLD AUTO: 0.03 THOUSANDS/ΜL (ref 0–0.1)
BASOPHILS NFR BLD AUTO: 1 % (ref 0–1)
BILIRUB SERPL-MCNC: 0.74 MG/DL (ref 0.2–1)
BUN SERPL-MCNC: 19 MG/DL (ref 5–25)
CALCIUM SERPL-MCNC: 9.3 MG/DL (ref 8.3–10.1)
CHLORIDE SERPL-SCNC: 104 MMOL/L (ref 100–108)
CHOLEST SERPL-MCNC: 250 MG/DL (ref 50–200)
CO2 SERPL-SCNC: 27 MMOL/L (ref 21–32)
CREAT SERPL-MCNC: 0.91 MG/DL (ref 0.6–1.3)
EOSINOPHIL # BLD AUTO: 0.11 THOUSAND/ΜL (ref 0–0.61)
EOSINOPHIL NFR BLD AUTO: 2 % (ref 0–6)
ERYTHROCYTE [DISTWIDTH] IN BLOOD BY AUTOMATED COUNT: 14.8 % (ref 11.6–15.1)
GFR SERPL CREATININE-BSD FRML MDRD: 69 ML/MIN/1.73SQ M
GLUCOSE P FAST SERPL-MCNC: 93 MG/DL (ref 65–99)
HCT VFR BLD AUTO: 46.2 % (ref 34.8–46.1)
HDLC SERPL-MCNC: 58 MG/DL
HGB BLD-MCNC: 15 G/DL (ref 11.5–15.4)
IMM GRANULOCYTES # BLD AUTO: 0.02 THOUSAND/UL (ref 0–0.2)
IMM GRANULOCYTES NFR BLD AUTO: 0 % (ref 0–2)
LDLC SERPL CALC-MCNC: 155 MG/DL (ref 0–100)
LYMPHOCYTES # BLD AUTO: 1.86 THOUSANDS/ΜL (ref 0.6–4.47)
LYMPHOCYTES NFR BLD AUTO: 30 % (ref 14–44)
MCH RBC QN AUTO: 32.1 PG (ref 26.8–34.3)
MCHC RBC AUTO-ENTMCNC: 32.5 G/DL (ref 31.4–37.4)
MCV RBC AUTO: 99 FL (ref 82–98)
MONOCYTES # BLD AUTO: 0.42 THOUSAND/ΜL (ref 0.17–1.22)
MONOCYTES NFR BLD AUTO: 7 % (ref 4–12)
NEUTROPHILS # BLD AUTO: 3.87 THOUSANDS/ΜL (ref 1.85–7.62)
NEUTS SEG NFR BLD AUTO: 60 % (ref 43–75)
NONHDLC SERPL-MCNC: 192 MG/DL
NRBC BLD AUTO-RTO: 0 /100 WBCS
PLATELET # BLD AUTO: 318 THOUSANDS/UL (ref 149–390)
PMV BLD AUTO: 10.8 FL (ref 8.9–12.7)
POTASSIUM SERPL-SCNC: 3.7 MMOL/L (ref 3.5–5.3)
PROT SERPL-MCNC: 7.8 G/DL (ref 6.4–8.2)
RBC # BLD AUTO: 4.68 MILLION/UL (ref 3.81–5.12)
SODIUM SERPL-SCNC: 139 MMOL/L (ref 136–145)
TRIGL SERPL-MCNC: 187 MG/DL
WBC # BLD AUTO: 6.31 THOUSAND/UL (ref 4.31–10.16)

## 2020-06-08 PROCEDURE — 82306 VITAMIN D 25 HYDROXY: CPT | Performed by: FAMILY MEDICINE

## 2020-06-08 PROCEDURE — 36415 COLL VENOUS BLD VENIPUNCTURE: CPT | Performed by: FAMILY MEDICINE

## 2020-06-08 PROCEDURE — 80061 LIPID PANEL: CPT | Performed by: FAMILY MEDICINE

## 2020-06-08 PROCEDURE — 80053 COMPREHEN METABOLIC PANEL: CPT | Performed by: FAMILY MEDICINE

## 2020-06-08 PROCEDURE — 85025 COMPLETE CBC W/AUTO DIFF WBC: CPT | Performed by: FAMILY MEDICINE

## 2020-06-15 ENCOUNTER — OFFICE VISIT (OUTPATIENT)
Dept: FAMILY MEDICINE CLINIC | Facility: CLINIC | Age: 60
End: 2020-06-15
Payer: COMMERCIAL

## 2020-06-15 VITALS
HEART RATE: 64 BPM | WEIGHT: 228 LBS | DIASTOLIC BLOOD PRESSURE: 72 MMHG | BODY MASS INDEX: 35.79 KG/M2 | RESPIRATION RATE: 16 BRPM | HEIGHT: 67 IN | TEMPERATURE: 96.8 F | SYSTOLIC BLOOD PRESSURE: 114 MMHG

## 2020-06-15 DIAGNOSIS — J45.40 MODERATE PERSISTENT ASTHMA WITHOUT COMPLICATION: ICD-10-CM

## 2020-06-15 DIAGNOSIS — E78.2 MIXED HYPERLIPIDEMIA: ICD-10-CM

## 2020-06-15 DIAGNOSIS — I10 ESSENTIAL HYPERTENSION: Primary | ICD-10-CM

## 2020-06-15 DIAGNOSIS — E55.9 VITAMIN D DEFICIENCY: ICD-10-CM

## 2020-06-15 DIAGNOSIS — J30.1 SEASONAL ALLERGIC RHINITIS DUE TO POLLEN: ICD-10-CM

## 2020-06-15 DIAGNOSIS — Z20.822 EXPOSURE TO COVID-19 VIRUS: ICD-10-CM

## 2020-06-15 PROCEDURE — 1036F TOBACCO NON-USER: CPT | Performed by: FAMILY MEDICINE

## 2020-06-15 PROCEDURE — 3008F BODY MASS INDEX DOCD: CPT | Performed by: FAMILY MEDICINE

## 2020-06-15 PROCEDURE — 3078F DIAST BP <80 MM HG: CPT | Performed by: FAMILY MEDICINE

## 2020-06-15 PROCEDURE — 99214 OFFICE O/P EST MOD 30 MIN: CPT | Performed by: FAMILY MEDICINE

## 2020-06-15 PROCEDURE — 3074F SYST BP LT 130 MM HG: CPT | Performed by: FAMILY MEDICINE

## 2020-06-15 RX ORDER — MV-MIN/VIT C/GLUT/LYSINE/HB124 1000-50 MG
2 TABLET, EFFERVESCENT ORAL DAILY
COMMUNITY
Start: 2020-01-31 | End: 2020-12-15 | Stop reason: ALTCHOICE

## 2020-08-07 DIAGNOSIS — J45.909 UNCOMPLICATED ASTHMA, UNSPECIFIED ASTHMA SEVERITY, UNSPECIFIED WHETHER PERSISTENT: ICD-10-CM

## 2020-08-07 RX ORDER — MOMETASONE FUROATE 220 UG/1
INHALANT RESPIRATORY (INHALATION)
Qty: 3 INHALER | Refills: 3 | Status: SHIPPED | OUTPATIENT
Start: 2020-08-07 | End: 2021-07-29

## 2020-09-23 DIAGNOSIS — I10 ESSENTIAL HYPERTENSION: ICD-10-CM

## 2020-09-23 RX ORDER — HYDROCHLOROTHIAZIDE 12.5 MG/1
CAPSULE, GELATIN COATED ORAL
Qty: 90 CAPSULE | Refills: 1 | Status: SHIPPED | OUTPATIENT
Start: 2020-09-23 | End: 2021-02-28

## 2020-11-13 DIAGNOSIS — J45.909 ASTHMA, UNSPECIFIED ASTHMA SEVERITY, UNSPECIFIED WHETHER COMPLICATED, UNSPECIFIED WHETHER PERSISTENT: ICD-10-CM

## 2020-11-13 RX ORDER — MONTELUKAST SODIUM 10 MG/1
TABLET ORAL
Qty: 90 TABLET | Refills: 1 | Status: SHIPPED | OUTPATIENT
Start: 2020-11-13 | End: 2021-04-19

## 2020-11-30 ENCOUNTER — LAB (OUTPATIENT)
Dept: LAB | Facility: CLINIC | Age: 60
End: 2020-11-30
Payer: COMMERCIAL

## 2020-11-30 DIAGNOSIS — Z20.822 EXPOSURE TO COVID-19 VIRUS: ICD-10-CM

## 2020-11-30 LAB
ALBUMIN SERPL BCP-MCNC: 3.7 G/DL (ref 3.5–5)
ALP SERPL-CCNC: 68 U/L (ref 46–116)
ALT SERPL W P-5'-P-CCNC: 40 U/L (ref 12–78)
ANION GAP SERPL CALCULATED.3IONS-SCNC: 4 MMOL/L (ref 4–13)
AST SERPL W P-5'-P-CCNC: 23 U/L (ref 5–45)
BASOPHILS # BLD AUTO: 0.03 THOUSANDS/ΜL (ref 0–0.1)
BASOPHILS NFR BLD AUTO: 0 % (ref 0–1)
BILIRUB SERPL-MCNC: 0.82 MG/DL (ref 0.2–1)
BUN SERPL-MCNC: 27 MG/DL (ref 5–25)
CALCIUM SERPL-MCNC: 10 MG/DL (ref 8.3–10.1)
CHLORIDE SERPL-SCNC: 106 MMOL/L (ref 100–108)
CHOLEST SERPL-MCNC: 231 MG/DL (ref 50–200)
CO2 SERPL-SCNC: 29 MMOL/L (ref 21–32)
CREAT SERPL-MCNC: 0.96 MG/DL (ref 0.6–1.3)
EOSINOPHIL # BLD AUTO: 0.15 THOUSAND/ΜL (ref 0–0.61)
EOSINOPHIL NFR BLD AUTO: 2 % (ref 0–6)
ERYTHROCYTE [DISTWIDTH] IN BLOOD BY AUTOMATED COUNT: 14.6 % (ref 11.6–15.1)
GFR SERPL CREATININE-BSD FRML MDRD: 64 ML/MIN/1.73SQ M
GLUCOSE P FAST SERPL-MCNC: 89 MG/DL (ref 65–99)
HCT VFR BLD AUTO: 44.8 % (ref 34.8–46.1)
HDLC SERPL-MCNC: 56 MG/DL
HGB BLD-MCNC: 14.6 G/DL (ref 11.5–15.4)
IMM GRANULOCYTES # BLD AUTO: 0.02 THOUSAND/UL (ref 0–0.2)
IMM GRANULOCYTES NFR BLD AUTO: 0 % (ref 0–2)
LDLC SERPL CALC-MCNC: 142 MG/DL (ref 0–100)
LYMPHOCYTES # BLD AUTO: 2.44 THOUSANDS/ΜL (ref 0.6–4.47)
LYMPHOCYTES NFR BLD AUTO: 34 % (ref 14–44)
MCH RBC QN AUTO: 32.1 PG (ref 26.8–34.3)
MCHC RBC AUTO-ENTMCNC: 32.6 G/DL (ref 31.4–37.4)
MCV RBC AUTO: 99 FL (ref 82–98)
MONOCYTES # BLD AUTO: 0.55 THOUSAND/ΜL (ref 0.17–1.22)
MONOCYTES NFR BLD AUTO: 8 % (ref 4–12)
NEUTROPHILS # BLD AUTO: 3.93 THOUSANDS/ΜL (ref 1.85–7.62)
NEUTS SEG NFR BLD AUTO: 56 % (ref 43–75)
NONHDLC SERPL-MCNC: 175 MG/DL
NRBC BLD AUTO-RTO: 0 /100 WBCS
PLATELET # BLD AUTO: 351 THOUSANDS/UL (ref 149–390)
PMV BLD AUTO: 10.7 FL (ref 8.9–12.7)
POTASSIUM SERPL-SCNC: 4.1 MMOL/L (ref 3.5–5.3)
PROT SERPL-MCNC: 7.4 G/DL (ref 6.4–8.2)
RBC # BLD AUTO: 4.55 MILLION/UL (ref 3.81–5.12)
SARS-COV-2 IGG+IGM SERPL QL IA: NORMAL
SODIUM SERPL-SCNC: 139 MMOL/L (ref 136–145)
TRIGL SERPL-MCNC: 166 MG/DL
TSH SERPL DL<=0.05 MIU/L-ACNC: 2.83 UIU/ML (ref 0.36–3.74)
WBC # BLD AUTO: 7.12 THOUSAND/UL (ref 4.31–10.16)

## 2020-11-30 PROCEDURE — 86769 SARS-COV-2 COVID-19 ANTIBODY: CPT

## 2020-11-30 PROCEDURE — 85025 COMPLETE CBC W/AUTO DIFF WBC: CPT | Performed by: FAMILY MEDICINE

## 2020-11-30 PROCEDURE — 80061 LIPID PANEL: CPT | Performed by: FAMILY MEDICINE

## 2020-11-30 PROCEDURE — 80053 COMPREHEN METABOLIC PANEL: CPT | Performed by: FAMILY MEDICINE

## 2020-11-30 PROCEDURE — 84443 ASSAY THYROID STIM HORMONE: CPT | Performed by: FAMILY MEDICINE

## 2020-11-30 PROCEDURE — 36415 COLL VENOUS BLD VENIPUNCTURE: CPT | Performed by: FAMILY MEDICINE

## 2020-12-15 ENCOUNTER — OFFICE VISIT (OUTPATIENT)
Dept: FAMILY MEDICINE CLINIC | Facility: CLINIC | Age: 60
End: 2020-12-15
Payer: COMMERCIAL

## 2020-12-15 VITALS
DIASTOLIC BLOOD PRESSURE: 80 MMHG | HEART RATE: 78 BPM | BODY MASS INDEX: 34.53 KG/M2 | HEIGHT: 67 IN | WEIGHT: 220 LBS | SYSTOLIC BLOOD PRESSURE: 132 MMHG | TEMPERATURE: 96.1 F

## 2020-12-15 DIAGNOSIS — E78.2 MIXED HYPERLIPIDEMIA: ICD-10-CM

## 2020-12-15 DIAGNOSIS — Z23 NEED FOR IMMUNIZATION AGAINST INFLUENZA: ICD-10-CM

## 2020-12-15 DIAGNOSIS — I10 ESSENTIAL HYPERTENSION: Primary | ICD-10-CM

## 2020-12-15 DIAGNOSIS — E55.9 VITAMIN D DEFICIENCY: ICD-10-CM

## 2020-12-15 DIAGNOSIS — J45.40 MODERATE PERSISTENT ASTHMA WITHOUT COMPLICATION: ICD-10-CM

## 2020-12-15 DIAGNOSIS — Z11.59 NEED FOR HEPATITIS C SCREENING TEST: ICD-10-CM

## 2020-12-15 PROCEDURE — 1036F TOBACCO NON-USER: CPT | Performed by: FAMILY MEDICINE

## 2020-12-15 PROCEDURE — 3075F SYST BP GE 130 - 139MM HG: CPT | Performed by: FAMILY MEDICINE

## 2020-12-15 PROCEDURE — 3725F SCREEN DEPRESSION PERFORMED: CPT | Performed by: FAMILY MEDICINE

## 2020-12-15 PROCEDURE — 90682 RIV4 VACC RECOMBINANT DNA IM: CPT

## 2020-12-15 PROCEDURE — 3079F DIAST BP 80-89 MM HG: CPT | Performed by: FAMILY MEDICINE

## 2020-12-15 PROCEDURE — 3008F BODY MASS INDEX DOCD: CPT | Performed by: FAMILY MEDICINE

## 2020-12-15 PROCEDURE — 90471 IMMUNIZATION ADMIN: CPT

## 2020-12-15 PROCEDURE — 99214 OFFICE O/P EST MOD 30 MIN: CPT | Performed by: FAMILY MEDICINE

## 2020-12-15 RX ORDER — ALBUTEROL SULFATE 90 UG/1
2 AEROSOL, METERED RESPIRATORY (INHALATION) 4 TIMES DAILY PRN
Qty: 18 G | Refills: 5 | Status: SHIPPED | OUTPATIENT
Start: 2020-12-15 | End: 2022-01-25 | Stop reason: SDUPTHER

## 2021-02-28 DIAGNOSIS — I10 ESSENTIAL HYPERTENSION: ICD-10-CM

## 2021-02-28 RX ORDER — HYDROCHLOROTHIAZIDE 12.5 MG/1
CAPSULE, GELATIN COATED ORAL
Qty: 90 CAPSULE | Refills: 1 | Status: SHIPPED | OUTPATIENT
Start: 2021-02-28 | End: 2021-10-18 | Stop reason: SDUPTHER

## 2021-03-22 ENCOUNTER — VBI (OUTPATIENT)
Dept: ADMINISTRATIVE | Facility: OTHER | Age: 61
End: 2021-03-22

## 2021-04-19 DIAGNOSIS — J45.909 ASTHMA, UNSPECIFIED ASTHMA SEVERITY, UNSPECIFIED WHETHER COMPLICATED, UNSPECIFIED WHETHER PERSISTENT: ICD-10-CM

## 2021-04-19 RX ORDER — MONTELUKAST SODIUM 10 MG/1
TABLET ORAL
Qty: 90 TABLET | Refills: 1 | Status: SHIPPED | OUTPATIENT
Start: 2021-04-19 | End: 2022-01-07 | Stop reason: SDUPTHER

## 2021-05-04 ENCOUNTER — VBI (OUTPATIENT)
Dept: ADMINISTRATIVE | Facility: OTHER | Age: 61
End: 2021-05-04

## 2021-06-03 ENCOUNTER — VBI (OUTPATIENT)
Dept: ADMINISTRATIVE | Facility: OTHER | Age: 61
End: 2021-06-03

## 2021-07-23 ENCOUNTER — VBI (OUTPATIENT)
Dept: ADMINISTRATIVE | Facility: OTHER | Age: 61
End: 2021-07-23

## 2021-07-23 NOTE — TELEPHONE ENCOUNTER
07/23/21 9:49 AM     See documentation in the VB [employer] SmartForm       [facility]

## 2021-07-26 ENCOUNTER — APPOINTMENT (OUTPATIENT)
Dept: LAB | Facility: CLINIC | Age: 61
End: 2021-07-26
Payer: COMMERCIAL

## 2021-07-26 DIAGNOSIS — Z11.59 NEED FOR HEPATITIS C SCREENING TEST: ICD-10-CM

## 2021-07-26 LAB
25(OH)D3 SERPL-MCNC: 42.1 NG/ML (ref 30–100)
ALBUMIN SERPL BCP-MCNC: 3.4 G/DL (ref 3.5–5)
ALP SERPL-CCNC: 59 U/L (ref 46–116)
ALT SERPL W P-5'-P-CCNC: 46 U/L (ref 12–78)
ANION GAP SERPL CALCULATED.3IONS-SCNC: 5 MMOL/L (ref 4–13)
AST SERPL W P-5'-P-CCNC: 26 U/L (ref 5–45)
BASOPHILS # BLD AUTO: 0.04 THOUSANDS/ΜL (ref 0–0.1)
BASOPHILS NFR BLD AUTO: 1 % (ref 0–1)
BILIRUB SERPL-MCNC: 0.83 MG/DL (ref 0.2–1)
BUN SERPL-MCNC: 23 MG/DL (ref 5–25)
CALCIUM ALBUM COR SERPL-MCNC: 9.9 MG/DL (ref 8.3–10.1)
CALCIUM SERPL-MCNC: 9.4 MG/DL (ref 8.3–10.1)
CHLORIDE SERPL-SCNC: 107 MMOL/L (ref 100–108)
CHOLEST SERPL-MCNC: 214 MG/DL (ref 50–200)
CO2 SERPL-SCNC: 28 MMOL/L (ref 21–32)
CREAT SERPL-MCNC: 0.91 MG/DL (ref 0.6–1.3)
EOSINOPHIL # BLD AUTO: 0.07 THOUSAND/ΜL (ref 0–0.61)
EOSINOPHIL NFR BLD AUTO: 1 % (ref 0–6)
ERYTHROCYTE [DISTWIDTH] IN BLOOD BY AUTOMATED COUNT: 14.4 % (ref 11.6–15.1)
GFR SERPL CREATININE-BSD FRML MDRD: 69 ML/MIN/1.73SQ M
GLUCOSE P FAST SERPL-MCNC: 85 MG/DL (ref 65–99)
HCT VFR BLD AUTO: 43.1 % (ref 34.8–46.1)
HCV AB SER QL: NORMAL
HDLC SERPL-MCNC: 53 MG/DL
HGB BLD-MCNC: 14.2 G/DL (ref 11.5–15.4)
IMM GRANULOCYTES # BLD AUTO: 0.04 THOUSAND/UL (ref 0–0.2)
IMM GRANULOCYTES NFR BLD AUTO: 1 % (ref 0–2)
LDLC SERPL CALC-MCNC: 129 MG/DL (ref 0–100)
LYMPHOCYTES # BLD AUTO: 1.9 THOUSANDS/ΜL (ref 0.6–4.47)
LYMPHOCYTES NFR BLD AUTO: 39 % (ref 14–44)
MCH RBC QN AUTO: 32.9 PG (ref 26.8–34.3)
MCHC RBC AUTO-ENTMCNC: 32.9 G/DL (ref 31.4–37.4)
MCV RBC AUTO: 100 FL (ref 82–98)
MONOCYTES # BLD AUTO: 0.64 THOUSAND/ΜL (ref 0.17–1.22)
MONOCYTES NFR BLD AUTO: 13 % (ref 4–12)
NEUTROPHILS # BLD AUTO: 2.17 THOUSANDS/ΜL (ref 1.85–7.62)
NEUTS SEG NFR BLD AUTO: 45 % (ref 43–75)
NONHDLC SERPL-MCNC: 161 MG/DL
NRBC BLD AUTO-RTO: 0 /100 WBCS
PLATELET # BLD AUTO: 287 THOUSANDS/UL (ref 149–390)
PMV BLD AUTO: 10.4 FL (ref 8.9–12.7)
POTASSIUM SERPL-SCNC: 3.7 MMOL/L (ref 3.5–5.3)
PROT SERPL-MCNC: 7.1 G/DL (ref 6.4–8.2)
RBC # BLD AUTO: 4.32 MILLION/UL (ref 3.81–5.12)
SODIUM SERPL-SCNC: 140 MMOL/L (ref 136–145)
TRIGL SERPL-MCNC: 159 MG/DL
WBC # BLD AUTO: 4.86 THOUSAND/UL (ref 4.31–10.16)

## 2021-07-26 PROCEDURE — 80053 COMPREHEN METABOLIC PANEL: CPT | Performed by: FAMILY MEDICINE

## 2021-07-26 PROCEDURE — 80061 LIPID PANEL: CPT | Performed by: FAMILY MEDICINE

## 2021-07-26 PROCEDURE — 85025 COMPLETE CBC W/AUTO DIFF WBC: CPT | Performed by: FAMILY MEDICINE

## 2021-07-26 PROCEDURE — 82306 VITAMIN D 25 HYDROXY: CPT | Performed by: FAMILY MEDICINE

## 2021-07-26 PROCEDURE — 36415 COLL VENOUS BLD VENIPUNCTURE: CPT | Performed by: FAMILY MEDICINE

## 2021-07-26 PROCEDURE — 86803 HEPATITIS C AB TEST: CPT

## 2021-07-27 ENCOUNTER — RA CDI HCC (OUTPATIENT)
Dept: OTHER | Facility: HOSPITAL | Age: 61
End: 2021-07-27

## 2021-07-27 NOTE — PROGRESS NOTES
[facility] 75  coding opportunities          Chart reviewed, no opportunity found: CHART REVIEWED, NO OPPORTUNITY FOUND                     Patients insurance company: [employer] (Medicare Advantage and Commercial)

## 2021-07-29 DIAGNOSIS — J45.909 UNCOMPLICATED ASTHMA, UNSPECIFIED ASTHMA SEVERITY, UNSPECIFIED WHETHER PERSISTENT: ICD-10-CM

## 2021-07-29 RX ORDER — MOMETASONE FUROATE 220 UG/1
INHALANT RESPIRATORY (INHALATION)
Qty: 3 EACH | Refills: 3 | Status: SHIPPED | OUTPATIENT
Start: 2021-07-29

## 2021-08-02 ENCOUNTER — OFFICE VISIT (OUTPATIENT)
Dept: FAMILY MEDICINE CLINIC | Facility: CLINIC | Age: 61
End: 2021-08-02
Payer: COMMERCIAL

## 2021-08-02 VITALS
TEMPERATURE: 97.9 F | HEIGHT: 67 IN | DIASTOLIC BLOOD PRESSURE: 64 MMHG | WEIGHT: 218 LBS | SYSTOLIC BLOOD PRESSURE: 110 MMHG | RESPIRATION RATE: 16 BRPM | BODY MASS INDEX: 34.21 KG/M2 | HEART RATE: 64 BPM
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
## 2021-08-02 DIAGNOSIS — J45.40 MODERATE PERSISTENT ASTHMA WITHOUT COMPLICATION: ICD-10-CM

## 2021-08-02 DIAGNOSIS — J30.1 SEASONAL ALLERGIC RHINITIS DUE TO POLLEN: ICD-10-CM

## 2021-08-02 DIAGNOSIS — E78.2 MIXED HYPERLIPIDEMIA: ICD-10-CM

## 2021-08-02 DIAGNOSIS — E55.9 VITAMIN D DEFICIENCY: ICD-10-CM

## 2021-08-02 DIAGNOSIS — I10 ESSENTIAL HYPERTENSION: Primary | ICD-10-CM

## 2021-08-02 PROCEDURE — 3008F BODY MASS INDEX DOCD: CPT | Performed by: FAMILY MEDICINE

## 2021-08-02 PROCEDURE — 3074F SYST BP LT 130 MM HG: CPT | Performed by: FAMILY MEDICINE

## 2021-08-02 PROCEDURE — 1036F TOBACCO NON-USER: CPT | Performed by: FAMILY MEDICINE

## 2021-08-02 PROCEDURE — 99214 OFFICE O/P EST MOD 30 MIN: CPT | Performed by: FAMILY MEDICINE

## 2021-08-02 PROCEDURE — 3078F DIAST BP <80 MM HG: CPT | Performed by: FAMILY MEDICINE

## 2021-08-02 NOTE — PROGRESS NOTES
Assessment/Plan:         Diagnoses and all orders for this visit:    Essential hypertension  -     CBC and differential  -     Comprehensive metabolic panel    Mixed hyperlipidemia  -     Lipid panel    Moderate persistent asthma without complication    Seasonal allergic rhinitis due to pollen    Vitamin D deficiency  -     Vitamin D 25 hydroxy    Tick bite, initial encounter  -     Lyme Total Antibody Profile with reflex to WB; Future          The 10-year ASCVD risk score (Talita Moreau et al , 2013) is: 3 5%    Values used to calculate the score:      Age: 61 years      Sex: Female      Is Non- : No      Diabetic: No      Tobacco smoker: No      Systolic Blood Pressure: 518 mmHg      Is BP treated: Yes      HDL Cholesterol: 53 mg/dL      Total Cholesterol: 214 mg/dL       Continue with current medications  Office visit 6 months with repeat labs at that time  Patient is not interested in a COVID-19 vaccine at this time    BMI Counseling: Body mass index is 34 45 kg/m²  The BMI is above normal  Nutrition recommendations include reducing portion sizes, decreasing overall calorie intake, consuming healthier snacks, moderation in carbohydrate intake, reducing intake of saturated fat and trans fat and reducing intake of cholesterol  Exercise recommendations include exercising 3-5 times per week  Patient ID: Manav Record is a 61 y o  female  Follow up visit  Medications reviewed  Labs 07/2021 see note  Hypertension blood pressures have been stable on HCTZ 12 5 mg daily  Creatinine 0 91  Electrolytes normal  K + 3 7  Hemoglobin 14 2  Hyperlipidemia on Omega 3 fatty acids 1 capsule/day- lipid profile cholesterol 214 decreased from 231  Triglycerides 159 decreased from 166  HDL 53     LFTs normal   FBS 85  11/2020 TSH 2 830    Recent Results (from the past 1344 hour(s))   Comprehensive metabolic panel    Collection Time: 07/26/21  8:41 AM   Result Value Ref Range    Sodium 140 136 - 145 mmol/L    Potassium 3 7 3 5 - 5 3 mmol/L    Chloride 107 100 - 108 mmol/L    CO2 28 21 - 32 mmol/L    ANION GAP 5 4 - 13 mmol/L    BUN 23 5 - 25 mg/dL    Creatinine 0 91 0 60 - 1 30 mg/dL    Glucose, Fasting 85 65 - 99 mg/dL    Calcium 9 4 8 3 - 10 1 mg/dL    Corrected Calcium 9 9 8 3 - 10 1 mg/dL    AST 26 5 - 45 U/L    ALT 46 12 - 78 U/L    Alkaline Phosphatase 59 46 - 116 U/L    Total Protein 7 1 6 4 - 8 2 g/dL    Albumin 3 4 (L) 3 5 - 5 0 g/dL    Total Bilirubin 0 83 0 20 - 1 00 mg/dL    eGFR 69 ml/min/1 73sq m   CBC and differential    Collection Time: 07/26/21  8:41 AM   Result Value Ref Range    WBC 4 86 4 31 - 10 16 Thousand/uL    RBC 4 32 3 81 - 5 12 Million/uL    Hemoglobin 14 2 11 5 - 15 4 g/dL    Hematocrit 43 1 34 8 - 46 1 %     (H) 82 - 98 fL    MCH 32 9 26 8 - 34 3 pg    MCHC 32 9 31 4 - 37 4 g/dL    RDW 14 4 11 6 - 15 1 %    MPV 10 4 8 9 - 12 7 fL    Platelets 789 970 - 819 Thousands/uL    nRBC 0 /100 WBCs    Neutrophils Relative 45 43 - 75 %    Immat GRANS % 1 0 - 2 %    Lymphocytes Relative 39 14 - 44 %    Monocytes Relative 13 (H) 4 - 12 %    Eosinophils Relative 1 0 - 6 %    Basophils Relative 1 0 - 1 %    Neutrophils Absolute 2 17 1 85 - 7 62 Thousands/µL    Immature Grans Absolute 0 04 0 00 - 0 20 Thousand/uL    Lymphocytes Absolute 1 90 0 60 - 4 47 Thousands/µL    Monocytes Absolute 0 64 0 17 - 1 22 Thousand/µL    Eosinophils Absolute 0 07 0 00 - 0 61 Thousand/µL    Basophils Absolute 0 04 0 00 - 0 10 Thousands/µL   Lipid panel    Collection Time: 07/26/21  8:41 AM   Result Value Ref Range    Cholesterol 214 (H) 50 - 200 mg/dL    Triglycerides 159 (H) <=150 mg/dL    HDL, Direct 53 >=40 mg/dL    LDL Calculated 129 (H) 0 - 100 mg/dL    Non-HDL-Chol (CHOL-HDL) 161 mg/dl   Vitamin D 25 hydroxy    Collection Time: 07/26/21  8:41 AM   Result Value Ref Range    Vit D, 25-Hydroxy 42 1 30 0 - 100 0 ng/mL   Hepatitis C antibody    Collection Time: 07/26/21  8:41 AM   Result Value Ref Range Hepatitis C Ab Non-reactive Non-reactive       Lab Results   Component Value Date    [id number] 2 830 11/30/2020             The following portions of the patient's history were reviewed and updated as appropriate: allergies, current medications, past family history, past medical history, past social history, past surgical history and problem list     Review of Systems   Constitutional: Negative for appetite change, chills, fatigue, fever and unexpected weight change  HENT: Negative for congestion, ear pain, rhinorrhea, sore throat and trouble swallowing  Eyes: Negative for visual disturbance  Respiratory: Negative for cough, shortness of breath and wheezing  Asthma stable on daily Asmanex and Singulair 10 mg daily and  ProAir generally once a day no nighttime symptoms   Cardiovascular: Negative for chest pain, palpitations and leg swelling  Gastrointestinal: Negative for abdominal pain, blood in stool, constipation, diarrhea, nausea and vomiting  No prior colonoscopy  07/2021 Hep C Ab negative  Endocrine:        Vitamin-D deficiency on vitamin D 5,000 units daily 07/2021 vitamin D 42 1   2013 DEXA scan normal     Genitourinary: Negative for difficulty urinating  Musculoskeletal: Negative for arthralgias, myalgias and neck pain  Skin: Negative for rash         "bite" RLE not a tick  Persistent small red lise  No bulls eye   Allergic/Immunologic: Positive for environmental allergies  Seasonal allergies on Allegra daily  No prior allergy testing   Neurological: Negative for dizziness, weakness and headaches  Hematological: Negative for adenopathy  Does not bruise/bleed easily  Psychiatric/Behavioral: Negative for dysphoric mood and sleep disturbance           Objective:      /64   Pulse 64   Temp 97 9 °F (36 6 °C)   Resp 16   Ht 5' 6 7" (1 694 m)   Wt 98 9 kg (218 lb)   BMI 34 45 kg/m²     BP Readings from Last 3 Encounters:   08/02/21 110/64   12/15/20 132/80 06/15/20 114/72     Wt Readings from Last 3 Encounters:   08/02/21 98 9 kg (218 lb)   12/15/20 99 8 kg (220 lb)   06/15/20 103 kg (228 lb)        Physical Exam  Vitals and nursing note reviewed  Constitutional:       General: She is not in acute distress  Appearance: She is well-developed  HENT:      Right Ear: Tympanic membrane and ear canal normal       Left Ear: Tympanic membrane and ear canal normal    Eyes:      General: No scleral icterus  Extraocular Movements: Extraocular movements intact  Conjunctiva/sclera: Conjunctivae normal       Pupils: Pupils are equal, round, and reactive to light  Neck:      Thyroid: No thyroid mass or thyromegaly  Vascular: No carotid bruit or JVD  Trachea: No tracheal deviation  Cardiovascular:      Rate and Rhythm: Normal rate and regular rhythm  Heart sounds: Normal heart sounds  No murmur heard  No gallop  Pulmonary:      Effort: Pulmonary effort is normal  No respiratory distress  Breath sounds: Normal breath sounds  No wheezing or rales  Musculoskeletal:      Right lower leg: No edema  Left lower leg: No edema  Lymphadenopathy:      Cervical: No cervical adenopathy  Upper Body:      Right upper body: No supraclavicular adenopathy  Left upper body: No supraclavicular adenopathy  Skin:     Findings: Lesion present  No rash  Nails: There is no clubbing  Comments: Small dermatofibroma ? RLE    Neurological:      General: No focal deficit present  Mental Status: She is alert and oriented to person, place, and time     Psychiatric:         Mood and Affect: Mood normal

## 2021-08-18 ENCOUNTER — VBI (OUTPATIENT)
Dept: ADMINISTRATIVE | Facility: OTHER | Age: 61
End: 2021-08-18

## 2021-10-08 ENCOUNTER — VBI (OUTPATIENT)
Dept: ADMINISTRATIVE | Facility: OTHER | Age: 61
End: 2021-10-08

## 2021-10-18 DIAGNOSIS — I10 ESSENTIAL HYPERTENSION: ICD-10-CM

## 2021-10-18 RX ORDER — HYDROCHLOROTHIAZIDE 12.5 MG/1
12.5 CAPSULE, GELATIN COATED ORAL DAILY
Qty: 90 CAPSULE | Refills: 0 | Status: SHIPPED | OUTPATIENT
Start: 2021-10-18 | End: 2022-01-10 | Stop reason: SDUPTHER

## 2021-10-27 ENCOUNTER — VBI (OUTPATIENT)
Dept: ADMINISTRATIVE | Facility: OTHER | Age: 61
End: 2021-10-27

## 2021-11-10 ENCOUNTER — IMMUNIZATIONS (OUTPATIENT)
Dept: FAMILY MEDICINE CLINIC | Facility: CLINIC | Age: 61
End: 2021-11-10
Payer: COMMERCIAL

## 2021-11-10 DIAGNOSIS — Z23 NEED FOR IMMUNIZATION AGAINST INFLUENZA: Primary | ICD-10-CM

## 2021-11-10 PROCEDURE — 90471 IMMUNIZATION ADMIN: CPT

## 2021-11-10 PROCEDURE — 90682 RIV4 VACC RECOMBINANT DNA IM: CPT

## 2021-11-26 ENCOUNTER — TELEPHONE (OUTPATIENT)
Dept: FAMILY MEDICINE CLINIC | Facility: CLINIC | Age: 61
End: 2021-11-26

## 2021-11-26 ENCOUNTER — TELEMEDICINE (OUTPATIENT)
Dept: FAMILY MEDICINE CLINIC | Facility: CLINIC | Age: 61
End: 2021-11-26
Payer: COMMERCIAL

## 2021-11-26 DIAGNOSIS — J45.40 MODERATE PERSISTENT ASTHMA WITHOUT COMPLICATION: ICD-10-CM

## 2021-11-26 DIAGNOSIS — J01.10 ACUTE NON-RECURRENT FRONTAL SINUSITIS: Primary | ICD-10-CM

## 2021-11-26 PROCEDURE — 99213 OFFICE O/P EST LOW 20 MIN: CPT | Performed by: PHYSICIAN ASSISTANT

## 2021-11-26 RX ORDER — AMOXICILLIN 875 MG/1
875 TABLET, COATED ORAL 2 TIMES DAILY
Qty: 10 TABLET | Refills: 0 | Status: SHIPPED | OUTPATIENT
Start: 2021-11-26 | End: 2021-12-01

## 2021-12-15 ENCOUNTER — VBI (OUTPATIENT)
Dept: ADMINISTRATIVE | Facility: OTHER | Age: 61
End: 2021-12-15

## 2022-01-07 DIAGNOSIS — J45.909 ASTHMA, UNSPECIFIED ASTHMA SEVERITY, UNSPECIFIED WHETHER COMPLICATED, UNSPECIFIED WHETHER PERSISTENT: ICD-10-CM

## 2022-01-10 DIAGNOSIS — I10 ESSENTIAL HYPERTENSION: ICD-10-CM

## 2022-01-10 RX ORDER — HYDROCHLOROTHIAZIDE 12.5 MG/1
12.5 CAPSULE, GELATIN COATED ORAL DAILY
Qty: 90 CAPSULE | Refills: 0 | Status: SHIPPED | OUTPATIENT
Start: 2022-01-10 | End: 2022-04-10 | Stop reason: SDUPTHER

## 2022-01-10 RX ORDER — MONTELUKAST SODIUM 10 MG/1
10 TABLET ORAL
Qty: 90 TABLET | Refills: 0 | Status: SHIPPED | OUTPATIENT
Start: 2022-01-10 | End: 2022-04-10 | Stop reason: SDUPTHER

## 2022-01-17 ENCOUNTER — APPOINTMENT (OUTPATIENT)
Dept: LAB | Facility: CLINIC | Age: 62
End: 2022-01-17
Payer: COMMERCIAL
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
## 2022-01-17 LAB
ALBUMIN SERPL BCP-MCNC: 3.3 G/DL (ref 3.5–5)
ALP SERPL-CCNC: 62 U/L (ref 46–116)
ALT SERPL W P-5'-P-CCNC: 41 U/L (ref 12–78)
ANION GAP SERPL CALCULATED.3IONS-SCNC: 6 MMOL/L (ref 4–13)
AST SERPL W P-5'-P-CCNC: 26 U/L (ref 5–45)
BASOPHILS # BLD AUTO: 0.01 THOUSANDS/ΜL (ref 0–0.1)
BASOPHILS NFR BLD AUTO: 0 % (ref 0–1)
BILIRUB SERPL-MCNC: 0.85 MG/DL (ref 0.2–1)
BUN SERPL-MCNC: 19 MG/DL (ref 5–25)
CALCIUM ALBUM COR SERPL-MCNC: 10 MG/DL (ref 8.3–10.1)
CALCIUM SERPL-MCNC: 9.4 MG/DL (ref 8.3–10.1)
CHLORIDE SERPL-SCNC: 106 MMOL/L (ref 100–108)
CHOLEST SERPL-MCNC: 189 MG/DL
CO2 SERPL-SCNC: 29 MMOL/L (ref 21–32)
CREAT SERPL-MCNC: 0.9 MG/DL (ref 0.6–1.3)
EOSINOPHIL # BLD AUTO: 0.11 THOUSAND/ΜL (ref 0–0.61)
EOSINOPHIL NFR BLD AUTO: 3 % (ref 0–6)
ERYTHROCYTE [DISTWIDTH] IN BLOOD BY AUTOMATED COUNT: 14.6 % (ref 11.6–15.1)
GFR SERPL CREATININE-BSD FRML MDRD: 69 ML/MIN/1.73SQ M
GLUCOSE P FAST SERPL-MCNC: 86 MG/DL (ref 65–99)
HCT VFR BLD AUTO: 45.3 % (ref 34.8–46.1)
HDLC SERPL-MCNC: 50 MG/DL
HGB BLD-MCNC: 14.8 G/DL (ref 11.5–15.4)
IMM GRANULOCYTES # BLD AUTO: 0.01 THOUSAND/UL (ref 0–0.2)
IMM GRANULOCYTES NFR BLD AUTO: 0 % (ref 0–2)
LDLC SERPL CALC-MCNC: 120 MG/DL (ref 0–100)
LYMPHOCYTES # BLD AUTO: 1.61 THOUSANDS/ΜL (ref 0.6–4.47)
LYMPHOCYTES NFR BLD AUTO: 38 % (ref 14–44)
MCH RBC QN AUTO: 31.9 PG (ref 26.8–34.3)
MCHC RBC AUTO-ENTMCNC: 32.7 G/DL (ref 31.4–37.4)
MCV RBC AUTO: 98 FL (ref 82–98)
MONOCYTES # BLD AUTO: 0.47 THOUSAND/ΜL (ref 0.17–1.22)
MONOCYTES NFR BLD AUTO: 11 % (ref 4–12)
NEUTROPHILS # BLD AUTO: 2.02 THOUSANDS/ΜL (ref 1.85–7.62)
NEUTS SEG NFR BLD AUTO: 48 % (ref 43–75)
NONHDLC SERPL-MCNC: 139 MG/DL
NRBC BLD AUTO-RTO: 0 /100 WBCS
PLATELET # BLD AUTO: 302 THOUSANDS/UL (ref 149–390)
PMV BLD AUTO: 10.3 FL (ref 8.9–12.7)
POTASSIUM SERPL-SCNC: 3.5 MMOL/L (ref 3.5–5.3)
PROT SERPL-MCNC: 7.4 G/DL (ref 6.4–8.2)
RBC # BLD AUTO: 4.64 MILLION/UL (ref 3.81–5.12)
SODIUM SERPL-SCNC: 141 MMOL/L (ref 136–145)
TRIGL SERPL-MCNC: 93 MG/DL
WBC # BLD AUTO: 4.23 THOUSAND/UL (ref 4.31–10.16)

## 2022-01-17 PROCEDURE — 36415 COLL VENOUS BLD VENIPUNCTURE: CPT | Performed by: FAMILY MEDICINE

## 2022-01-17 PROCEDURE — 86618 LYME DISEASE ANTIBODY: CPT

## 2022-01-17 PROCEDURE — 80053 COMPREHEN METABOLIC PANEL: CPT | Performed by: FAMILY MEDICINE

## 2022-01-17 PROCEDURE — 82306 VITAMIN D 25 HYDROXY: CPT | Performed by: FAMILY MEDICINE

## 2022-01-17 PROCEDURE — 85025 COMPLETE CBC W/AUTO DIFF WBC: CPT | Performed by: FAMILY MEDICINE

## 2022-01-17 PROCEDURE — 80061 LIPID PANEL: CPT | Performed by: FAMILY MEDICINE

## 2022-01-18 LAB — B BURGDOR IGG+IGM SER-ACNC: 9

## 2022-01-19 LAB — 25(OH)D3 SERPL-MCNC: 44.7 NG/ML (ref 30–100)

## 2022-01-31 ENCOUNTER — RA CDI HCC (OUTPATIENT)
Dept: OTHER | Facility: HOSPITAL | Age: 62
End: 2022-01-31

## 2022-01-31 NOTE — PROGRESS NOTES
[facility] 75  coding opportunities       Chart reviewed, no opportunity found: CHART REVIEWED, NO OPPORTUNITY FOUND                        Patients insurance company: [employer] (Medicare Advantage and Commercial)

## 2022-02-03 ENCOUNTER — VBI (OUTPATIENT)
Dept: ADMINISTRATIVE | Facility: OTHER | Age: 62
End: 2022-02-03

## 2022-03-03 ENCOUNTER — TELEPHONE (OUTPATIENT)
Dept: FAMILY MEDICINE CLINIC | Facility: CLINIC | Age: 62
End: 2022-03-03

## 2022-03-04 DIAGNOSIS — Z12.31 ENCOUNTER FOR SCREENING MAMMOGRAM FOR MALIGNANT NEOPLASM OF BREAST: Primary | ICD-10-CM

## 2022-03-16 ENCOUNTER — OFFICE VISIT (OUTPATIENT)
Dept: FAMILY MEDICINE CLINIC | Facility: CLINIC | Age: 62
End: 2022-03-16
Payer: COMMERCIAL

## 2022-03-16 VITALS
HEIGHT: 67 IN | DIASTOLIC BLOOD PRESSURE: 74 MMHG | HEART RATE: 76 BPM | WEIGHT: 217 LBS | BODY MASS INDEX: 34.06 KG/M2 | SYSTOLIC BLOOD PRESSURE: 118 MMHG | TEMPERATURE: 99.1 F | RESPIRATION RATE: 16 BRPM

## 2022-03-16 DIAGNOSIS — E55.9 VITAMIN D DEFICIENCY: ICD-10-CM

## 2022-03-16 DIAGNOSIS — J45.40 MODERATE PERSISTENT ASTHMA WITHOUT COMPLICATION: ICD-10-CM

## 2022-03-16 DIAGNOSIS — Z00.00 WELL ADULT EXAM: Primary | ICD-10-CM

## 2022-03-16 DIAGNOSIS — I10 PRIMARY HYPERTENSION: ICD-10-CM

## 2022-03-16 DIAGNOSIS — J30.1 SEASONAL ALLERGIC RHINITIS DUE TO POLLEN: ICD-10-CM

## 2022-03-16 DIAGNOSIS — E78.2 MIXED HYPERLIPIDEMIA: ICD-10-CM

## 2022-03-16 PROCEDURE — 99396 PREV VISIT EST AGE 40-64: CPT | Performed by: FAMILY MEDICINE

## 2022-03-16 PROCEDURE — 1036F TOBACCO NON-USER: CPT | Performed by: FAMILY MEDICINE

## 2022-03-16 PROCEDURE — 3725F SCREEN DEPRESSION PERFORMED: CPT | Performed by: FAMILY MEDICINE

## 2022-03-16 PROCEDURE — 3008F BODY MASS INDEX DOCD: CPT | Performed by: FAMILY MEDICINE

## 2022-03-16 NOTE — PROGRESS NOTES
Assessment/Plan:     Diagnoses and all orders for this visit:    Well adult exam    Primary hypertension  -     Comprehensive metabolic panel  -     CBC and differential    Mixed hyperlipidemia  -     Lipid panel    Moderate persistent asthma without complication    Seasonal allergic rhinitis due to pollen    Vitamin D deficiency  -     Vitamin D 25 hydroxy          Continue with current medications  Office visit 6 months with repeat labs  Patient is not interested a mammogram, colon cancer screening or COV 19 vaccine  BMI Counseling: Body mass index is 34 19 kg/m²  The BMI is above normal  Nutrition recommendations include reducing portion sizes, decreasing overall calorie intake, consuming healthier snacks, moderation in carbohydrate intake, reducing intake of saturated fat and trans fat and reducing intake of cholesterol  Patient ID: Jeremiah Castellanos is a 64 y o  female  64year old female here for Wellness exam   Medications reviewed  Hospitalizations/surgeries/SH/FH reviewed see note  Labs 01/2022 see note  Hypertension blood pressures have been stable on HCTZ 12 5 mg daily  Creatinine 0 90  GFR 69  Electrolytes normal  K + 3 5  Hemoglobin 14 8  Hyperlipidemia on Omega 3 fatty acids 1 capsule/day- lipid profile cholesterol 189 decreased from 214  Triglycerides 93 decreased from 159  HDL 50     LFTs normal   FBS 86  11/2020 TSH 2 830      Recent Results (from the past 2016 hour(s))   CBC and differential    Collection Time: 01/17/22 10:13 AM   Result Value Ref Range    WBC 4 23 (L) 4 31 - 10 16 Thousand/uL    RBC 4 64 3 81 - 5 12 Million/uL    Hemoglobin 14 8 11 5 - 15 4 g/dL    Hematocrit 45 3 34 8 - 46 1 %    MCV 98 82 - 98 fL    MCH 31 9 26 8 - 34 3 pg    MCHC 32 7 31 4 - 37 4 g/dL    RDW 14 6 11 6 - 15 1 %    MPV 10 3 8 9 - 12 7 fL    Platelets 945 772 - 671 Thousands/uL    nRBC 0 /100 WBCs    Neutrophils Relative 48 43 - 75 %    Immat GRANS % 0 0 - 2 %    Lymphocytes Relative 38 14 - 44 %    Monocytes Relative 11 4 - 12 %    Eosinophils Relative 3 0 - 6 %    Basophils Relative 0 0 - 1 %    Neutrophils Absolute 2 02 1 85 - 7 62 Thousands/µL    Immature Grans Absolute 0 01 0 00 - 0 20 Thousand/uL    Lymphocytes Absolute 1 61 0 60 - 4 47 Thousands/µL    Monocytes Absolute 0 47 0 17 - 1 22 Thousand/µL    Eosinophils Absolute 0 11 0 00 - 0 61 Thousand/µL    Basophils Absolute 0 01 0 00 - 0 10 Thousands/µL   Comprehensive metabolic panel    Collection Time: 01/17/22 10:13 AM   Result Value Ref Range    Sodium 141 136 - 145 mmol/L    Potassium 3 5 3 5 - 5 3 mmol/L    Chloride 106 100 - 108 mmol/L    CO2 29 21 - 32 mmol/L    ANION GAP 6 4 - 13 mmol/L    BUN 19 5 - 25 mg/dL    Creatinine 0 90 0 60 - 1 30 mg/dL    Glucose, Fasting 86 65 - 99 mg/dL    Calcium 9 4 8 3 - 10 1 mg/dL    Corrected Calcium 10 0 8 3 - 10 1 mg/dL    AST 26 5 - 45 U/L    ALT 41 12 - 78 U/L    Alkaline Phosphatase 62 46 - 116 U/L    Total Protein 7 4 6 4 - 8 2 g/dL    Albumin 3 3 (L) 3 5 - 5 0 g/dL    Total Bilirubin 0 85 0 20 - 1 00 mg/dL    eGFR 69 ml/min/1 73sq m   Lipid panel    Collection Time: 01/17/22 10:13 AM   Result Value Ref Range    Cholesterol 189 See Comment mg/dL    Triglycerides 93 See Comment mg/dL    HDL, Direct 50 >=50 mg/dL    LDL Calculated 120 (H) 0 - 100 mg/dL    Non-HDL-Chol (CHOL-HDL) 139 mg/dl   Vitamin D 25 hydroxy    Collection Time: 01/17/22 10:13 AM   Result Value Ref Range    Vit D, 25-Hydroxy 44 7 30 0 - 100 0 ng/mL   Lyme Total Antibody Profile with reflex to WB    Collection Time: 01/17/22 10:13 AM   Result Value Ref Range    Lyme total antibody 9 0 00 - 119     Lab Results   Component Value Date    [id number] 2 830 11/30/2020             Allergies   Allergen Reactions    Banana - Food Allergy Throat Swelling     RAW BANANAS    Salmeterol Shortness Of Breath    Latex Rash       Current Outpatient Medications:     albuterol (PROVENTIL HFA,VENTOLIN HFA) 90 mcg/act inhaler, Inhale 2 puffs 4 (four) times a day as needed for wheezing, Disp: 18 g, Rfl: 2    Asmanex, 60 Metered Doses, 220 MCG/INH inhaler, INHALE 2 PUFFS BY MOUTH EVERY DAY, Disp: 3 each, Rfl: 3    b complex vitamins capsule, Take 1 capsule by mouth daily, Disp: , Rfl:     Cholecalciferol (VITAMIN D3) 2000 units capsule, Take 5,000 Units by mouth daily  , Disp: , Rfl:     fexofenadine (ALLEGRA) 60 MG tablet, Take 1 tablet by mouth daily, Disp: , Rfl:     hydrochlorothiazide (MICROZIDE) 12 5 mg capsule, Take 1 capsule (12 5 mg total) by mouth daily, Disp: 90 capsule, Rfl: 0    montelukast (SINGULAIR) 10 mg tablet, Take 1 tablet (10 mg total) by mouth daily at bedtime, Disp: 90 tablet, Rfl: 0    Omega-3 Fatty Acids (FISH OIL) 1,000 mg, 1 capsule by Does not apply route daily, Disp: , Rfl:      Social History     Tobacco Use    Smoking status: Never Smoker    Smokeless tobacco: Never Used   Substance Use Topics    Alcohol use: Yes     Alcohol/week: 1 0 standard drink     Types: 1 Glasses of wine per week    Drug use: No     Family History   Problem Relation Age of Onset    Mitral valve prolapse Mother         Echo mitral valve systolic bileaflet prolapse    Mitral valve prolapse Father         Echo mitral valve systolic bileaflet prolapse    Hypertension Maternal Grandmother      Past Surgical History:   Procedure Laterality Date    TONSILLECTOMY           The following portions of the patient's history were reviewed and updated as appropriate: allergies, current medications, past family history, past medical history, past social history, past surgical history and problem list     Review of Systems   Constitutional: Positive for unexpected weight change (3 lb weight loss from 12/2020)  Negative for appetite change, chills, fatigue and fever  HENT: Negative for congestion, ear pain, rhinorrhea, sore throat and trouble swallowing  Eyes: Negative for visual disturbance     Respiratory: Negative for cough, shortness of breath and wheezing  Asthma stable on daily Asmanex and Singulair 10 mg daily and  ProAir generally once a day no nighttime symptoms   Cardiovascular: Negative for chest pain, palpitations and leg swelling  Gastrointestinal: Negative for abdominal pain, blood in stool, constipation, diarrhea, nausea and vomiting  No prior colonoscopy  07/2021 Hep C Ab negative  Endocrine:        Vitamin-D deficiency on vitamin D 5,000 units daily 01/2022 vitamin D 44 7   2013 DEXA scan normal     Genitourinary: Negative for difficulty urinating  Musculoskeletal: Negative for arthralgias, myalgias and neck pain  Skin: Negative for rash  Allergic/Immunologic: Positive for environmental allergies  Seasonal allergies on Allegra daily  No prior allergy testing   Neurological: Negative for dizziness, weakness and headaches  Hematological: Negative for adenopathy  Does not bruise/bleed easily  Mild leukopenia  CBC 01/2022 drawn several days after viral gastroenteritis  Component                Value               Date                       WBC                      4 23 (L)            01/17/2022                 HGB                      14 8                01/17/2022                 HCT                      45 3                01/17/2022                 MCV                      98                  01/17/2022                 PLT                      302                 01/17/2022              WBC       Date                     Value               Ref Range           Status                01/17/2022               4 23 (L)            4 31 - 10 16 T*     Final                 07/26/2021               4 86                4 31 - 10 16 T*     Final                 11/30/2020               7 12                4 31 - 10 16 T*     Final                 Psychiatric/Behavioral: Negative for dysphoric mood and sleep disturbance           Objective:    /74   Pulse 76   Temp 99 1 °F (37 3 °C)   Resp 16   Ht 5' 6 8" (1 697 m)   Wt 98 4 kg (217 lb)   BMI 34 19 kg/m²     BP Readings from Last 3 Encounters:   03/16/22 118/74   08/02/21 110/64   12/15/20 132/80     Wt Readings from Last 3 Encounters:   03/16/22 98 4 kg (217 lb)   08/02/21 98 9 kg (218 lb)   12/15/20 99 8 kg (220 lb)          Physical Exam  Vitals and nursing note reviewed  Constitutional:       General: She is not in acute distress  Appearance: She is well-developed  HENT:      Right Ear: Tympanic membrane and ear canal normal       Left Ear: Tympanic membrane and ear canal normal    Eyes:      General: No scleral icterus  Extraocular Movements: Extraocular movements intact  Conjunctiva/sclera: Conjunctivae normal       Pupils: Pupils are equal, round, and reactive to light  Neck:      Thyroid: No thyroid mass or thyromegaly  Vascular: No carotid bruit or JVD  Trachea: No tracheal deviation  Cardiovascular:      Rate and Rhythm: Normal rate and regular rhythm  Heart sounds: Normal heart sounds  No murmur heard  No gallop  Pulmonary:      Effort: Pulmonary effort is normal  No respiratory distress  Breath sounds: Normal breath sounds  No wheezing or rales  Chest:   Breasts:      Right: No supraclavicular adenopathy  Left: No supraclavicular adenopathy  Musculoskeletal:      Right lower leg: No edema  Left lower leg: No edema  Lymphadenopathy:      Cervical: No cervical adenopathy  Upper Body:      Right upper body: No supraclavicular adenopathy  Left upper body: No supraclavicular adenopathy  Skin:     Findings: No rash  Nails: There is no clubbing  Neurological:      General: No focal deficit present  Mental Status: She is alert and oriented to person, place, and time     Psychiatric:         Mood and Affect: Mood normal          Behavior: Behavior normal

## 2022-04-10 DIAGNOSIS — I10 ESSENTIAL HYPERTENSION: ICD-10-CM

## 2022-04-10 DIAGNOSIS — J45.909 ASTHMA, UNSPECIFIED ASTHMA SEVERITY, UNSPECIFIED WHETHER COMPLICATED, UNSPECIFIED WHETHER PERSISTENT: ICD-10-CM

## 2022-04-11 RX ORDER — MONTELUKAST SODIUM 10 MG/1
10 TABLET ORAL
Qty: 90 TABLET | Refills: 0 | Status: SHIPPED | OUTPATIENT
Start: 2022-04-11 | End: 2022-07-06 | Stop reason: SDUPTHER

## 2022-04-11 RX ORDER — HYDROCHLOROTHIAZIDE 12.5 MG/1
12.5 CAPSULE, GELATIN COATED ORAL DAILY
Qty: 90 CAPSULE | Refills: 0 | Status: SHIPPED | OUTPATIENT
Start: 2022-04-11 | End: 2022-07-06 | Stop reason: SDUPTHER

## 2022-05-10 ENCOUNTER — VBI (OUTPATIENT)
Dept: ADMINISTRATIVE | Facility: OTHER | Age: 62
End: 2022-05-10

## 2022-05-24 ENCOUNTER — VBI (OUTPATIENT)
Dept: ADMINISTRATIVE | Facility: OTHER | Age: 62
End: 2022-05-24

## 2022-07-06 DIAGNOSIS — I10 ESSENTIAL HYPERTENSION: ICD-10-CM

## 2022-07-06 DIAGNOSIS — J45.909 ASTHMA, UNSPECIFIED ASTHMA SEVERITY, UNSPECIFIED WHETHER COMPLICATED, UNSPECIFIED WHETHER PERSISTENT: ICD-10-CM

## 2022-07-06 RX ORDER — MONTELUKAST SODIUM 10 MG/1
10 TABLET ORAL
Qty: 90 TABLET | Refills: 0 | Status: SHIPPED | OUTPATIENT
Start: 2022-07-06 | End: 2022-10-12 | Stop reason: SDUPTHER

## 2022-07-06 RX ORDER — HYDROCHLOROTHIAZIDE 12.5 MG/1
12.5 CAPSULE, GELATIN COATED ORAL DAILY
Qty: 90 CAPSULE | Refills: 0 | Status: SHIPPED | OUTPATIENT
Start: 2022-07-06 | End: 2022-10-12 | Stop reason: SDUPTHER

## 2022-08-11 ENCOUNTER — VBI (OUTPATIENT)
Dept: ADMINISTRATIVE | Facility: OTHER | Age: 62
End: 2022-08-11

## 2022-08-24 ENCOUNTER — VBI (OUTPATIENT)
Dept: ADMINISTRATIVE | Facility: OTHER | Age: 62
End: 2022-08-24

## 2022-09-01 DIAGNOSIS — J45.909 UNCOMPLICATED ASTHMA, UNSPECIFIED ASTHMA SEVERITY, UNSPECIFIED WHETHER PERSISTENT: ICD-10-CM

## 2022-09-07 ENCOUNTER — APPOINTMENT (OUTPATIENT)
Dept: LAB | Facility: CLINIC | Age: 62
End: 2022-09-07
Payer: COMMERCIAL

## 2022-09-07 LAB
25(OH)D3 SERPL-MCNC: 44.5 NG/ML (ref 30–100)
ALBUMIN SERPL BCP-MCNC: 3.5 G/DL (ref 3.5–5)
ALP SERPL-CCNC: 57 U/L (ref 46–116)
ALT SERPL W P-5'-P-CCNC: 37 U/L (ref 12–78)
ANION GAP SERPL CALCULATED.3IONS-SCNC: 6 MMOL/L (ref 4–13)
AST SERPL W P-5'-P-CCNC: 20 U/L (ref 5–45)
BASOPHILS # BLD AUTO: 0.03 THOUSANDS/ΜL (ref 0–0.1)
BASOPHILS NFR BLD AUTO: 1 % (ref 0–1)
BILIRUB SERPL-MCNC: 0.87 MG/DL (ref 0.2–1)
BUN SERPL-MCNC: 22 MG/DL (ref 5–25)
CALCIUM SERPL-MCNC: 9.8 MG/DL (ref 8.3–10.1)
CHLORIDE SERPL-SCNC: 106 MMOL/L (ref 96–108)
CHOLEST SERPL-MCNC: 230 MG/DL
CO2 SERPL-SCNC: 29 MMOL/L (ref 21–32)
CREAT SERPL-MCNC: 0.95 MG/DL (ref 0.6–1.3)
EOSINOPHIL # BLD AUTO: 0.11 THOUSAND/ΜL (ref 0–0.61)
EOSINOPHIL NFR BLD AUTO: 2 % (ref 0–6)
ERYTHROCYTE [DISTWIDTH] IN BLOOD BY AUTOMATED COUNT: 14.5 % (ref 11.6–15.1)
GFR SERPL CREATININE-BSD FRML MDRD: 64 ML/MIN/1.73SQ M
GLUCOSE P FAST SERPL-MCNC: 85 MG/DL (ref 65–99)
HCT VFR BLD AUTO: 43.8 % (ref 34.8–46.1)
HDLC SERPL-MCNC: 54 MG/DL
HGB BLD-MCNC: 14.2 G/DL (ref 11.5–15.4)
IMM GRANULOCYTES # BLD AUTO: 0.01 THOUSAND/UL (ref 0–0.2)
IMM GRANULOCYTES NFR BLD AUTO: 0 % (ref 0–2)
LDLC SERPL CALC-MCNC: 136 MG/DL (ref 0–100)
LYMPHOCYTES # BLD AUTO: 1.84 THOUSANDS/ΜL (ref 0.6–4.47)
LYMPHOCYTES NFR BLD AUTO: 30 % (ref 14–44)
MCH RBC QN AUTO: 32.4 PG (ref 26.8–34.3)
MCHC RBC AUTO-ENTMCNC: 32.4 G/DL (ref 31.4–37.4)
MCV RBC AUTO: 100 FL (ref 82–98)
MONOCYTES # BLD AUTO: 0.51 THOUSAND/ΜL (ref 0.17–1.22)
MONOCYTES NFR BLD AUTO: 8 % (ref 4–12)
NEUTROPHILS # BLD AUTO: 3.6 THOUSANDS/ΜL (ref 1.85–7.62)
NEUTS SEG NFR BLD AUTO: 59 % (ref 43–75)
NONHDLC SERPL-MCNC: 176 MG/DL
NRBC BLD AUTO-RTO: 0 /100 WBCS
PLATELET # BLD AUTO: 321 THOUSANDS/UL (ref 149–390)
PMV BLD AUTO: 10.3 FL (ref 8.9–12.7)
POTASSIUM SERPL-SCNC: 3.9 MMOL/L (ref 3.5–5.3)
PROT SERPL-MCNC: 7.3 G/DL (ref 6.4–8.4)
RBC # BLD AUTO: 4.38 MILLION/UL (ref 3.81–5.12)
SODIUM SERPL-SCNC: 141 MMOL/L (ref 135–147)
TRIGL SERPL-MCNC: 198 MG/DL
WBC # BLD AUTO: 6.1 THOUSAND/UL (ref 4.31–10.16)

## 2022-09-12 ENCOUNTER — VBI (OUTPATIENT)
Dept: ADMINISTRATIVE | Facility: OTHER | Age: 62
End: 2022-09-12

## 2022-09-19 ENCOUNTER — OFFICE VISIT (OUTPATIENT)
Dept: FAMILY MEDICINE CLINIC | Facility: CLINIC | Age: 62
End: 2022-09-19
Payer: COMMERCIAL

## 2022-09-19 VITALS
RESPIRATION RATE: 16 BRPM | BODY MASS INDEX: 34.84 KG/M2 | SYSTOLIC BLOOD PRESSURE: 108 MMHG | HEIGHT: 67 IN | HEART RATE: 76 BPM | OXYGEN SATURATION: 96 % | WEIGHT: 222 LBS | DIASTOLIC BLOOD PRESSURE: 60 MMHG | TEMPERATURE: 97.4 F

## 2022-09-19 DIAGNOSIS — J45.40 MODERATE PERSISTENT ASTHMA WITHOUT COMPLICATION: ICD-10-CM

## 2022-09-19 DIAGNOSIS — I10 PRIMARY HYPERTENSION: Primary | ICD-10-CM

## 2022-09-19 DIAGNOSIS — J30.1 SEASONAL ALLERGIC RHINITIS DUE TO POLLEN: ICD-10-CM

## 2022-09-19 DIAGNOSIS — M54.42 CHRONIC BILATERAL LOW BACK PAIN WITH LEFT-SIDED SCIATICA: ICD-10-CM

## 2022-09-19 DIAGNOSIS — G89.29 CHRONIC BILATERAL LOW BACK PAIN WITH LEFT-SIDED SCIATICA: ICD-10-CM

## 2022-09-19 DIAGNOSIS — E78.2 MIXED HYPERLIPIDEMIA: ICD-10-CM

## 2022-09-19 PROCEDURE — 99214 OFFICE O/P EST MOD 30 MIN: CPT | Performed by: FAMILY MEDICINE

## 2022-09-19 NOTE — PROGRESS NOTES
Name: Alex Shah      : 1960      MRN: [id number]  Encounter Provider: Jayme Che MD  Encounter Date: 2022   Encounter department: [facility]     1  Primary hypertension  -     Comprehensive metabolic panel  -     CBC and differential    2  Mixed hyperlipidemia  -     Lipid panel    3  Moderate persistent asthma without complication    4  Seasonal allergic rhinitis due to pollen    5  Chronic bilateral low back pain with left-sided sciatica  -     XR spine lumbar minimum 4 views non injury; Future; Expected date: 2022      Continue with current medications  Watch diet  X rays lumbar spine  Consider trial of PT  PRN ES Tylenol/Advil for pain  Advised to call if any changes  OV 6 months with labs  The 10-year ASCVD risk score (Galileo Garduno et al , 2013) is: 3 9%    Values used to calculate the score:      Age: 64 years      Sex: Female      Is Non- : No      Diabetic: No      Tobacco smoker: No      Systolic Blood Pressure: 963 mmHg      Is BP treated: Yes      HDL Cholesterol: 54 mg/dL      Total Cholesterol: 230 mg/dL          Subjective      Follow up visit  Medications reviewed     Labs 2022 see note  Hypertension blood pressures have been stable on HCTZ 12 5 mg daily  Creatinine 0 95  GFR 64  Electrolytes normal  K + 3 9  Hemoglobin 14  Andrew Manifold Hyperlipidemia mixed type on Omega 3 fatty acids 1 capsule/day- lipid profile cholesterol 230 increased from 189  Triglycerides 198 increased from 93 decreased from 159  HDL 54     "eating too much cheese"  LFTs normal   FBS 85  2020 TSH 2 830    Recent Results (from the past 672 hour(s))   Comprehensive metabolic panel    Collection Time: 22  9:19 AM   Result Value Ref Range    Sodium 141 135 - 147 mmol/L    Potassium 3 9 3 5 - 5 3 mmol/L    Chloride 106 96 - 108 mmol/L    CO2 29 21 - 32 mmol/L    ANION GAP 6 4 - 13 mmol/L    BUN 22 5 - 25 mg/dL    Creatinine 0  95 0 60 - 1 30 mg/dL    Glucose, Fasting 85 65 - 99 mg/dL    Calcium 9 8 8 3 - 10 1 mg/dL    AST 20 5 - 45 U/L    ALT 37 12 - 78 U/L    Alkaline Phosphatase 57 46 - 116 U/L    Total Protein 7 3 6 4 - 8 4 g/dL    Albumin 3 5 3 5 - 5 0 g/dL    Total Bilirubin 0 87 0 20 - 1 00 mg/dL    eGFR 64 ml/min/1 73sq m   CBC and differential    Collection Time: 09/07/22  9:19 AM   Result Value Ref Range    WBC 6 10 4 31 - 10 16 Thousand/uL    RBC 4 38 3 81 - 5 12 Million/uL    Hemoglobin 14 2 11 5 - 15 4 g/dL    Hematocrit 43 8 34 8 - 46 1 %     (H) 82 - 98 fL    MCH 32 4 26 8 - 34 3 pg    MCHC 32 4 31 4 - 37 4 g/dL    RDW 14 5 11 6 - 15 1 %    MPV 10 3 8 9 - 12 7 fL    Platelets 681 870 - 604 Thousands/uL    nRBC 0 /100 WBCs    Neutrophils Relative 59 43 - 75 %    Immat GRANS % 0 0 - 2 %    Lymphocytes Relative 30 14 - 44 %    Monocytes Relative 8 4 - 12 %    Eosinophils Relative 2 0 - 6 %    Basophils Relative 1 0 - 1 %    Neutrophils Absolute 3 60 1 85 - 7 62 Thousands/µL    Immature Grans Absolute 0 01 0 00 - 0 20 Thousand/uL    Lymphocytes Absolute 1 84 0 60 - 4 47 Thousands/µL    Monocytes Absolute 0 51 0 17 - 1 22 Thousand/µL    Eosinophils Absolute 0 11 0 00 - 0 61 Thousand/µL    Basophils Absolute 0 03 0 00 - 0 10 Thousands/µL   Lipid panel    Collection Time: 09/07/22  9:19 AM   Result Value Ref Range    Cholesterol 230 (H) See Comment mg/dL    Triglycerides 198 (H) See Comment mg/dL    HDL, Direct 54 >=50 mg/dL    LDL Calculated 136 (H) 0 - 100 mg/dL    Non-HDL-Chol (CHOL-HDL) 176 mg/dl   Vitamin D 25 hydroxy    Collection Time: 09/07/22  9:19 AM   Result Value Ref Range    Vit D, 25-Hydroxy 44 5 30 0 - 100 0 ng/mL             Review of Systems   Constitutional: Negative for appetite change, chills, fatigue, fever and unexpected weight change  HENT: Negative for congestion, ear pain, rhinorrhea, sore throat and trouble swallowing  Eyes: Negative for visual disturbance     Respiratory: Negative for cough, shortness of breath and wheezing  Asthma stable on daily Asmanex and Singulair 10 mg daily and  ProAir generally once a day no nighttime symptoms   Cardiovascular: Negative for chest pain, palpitations and leg swelling  Gastrointestinal: Negative for abdominal pain, blood in stool, constipation, diarrhea, nausea and vomiting  No prior colonoscopy  07/2021 Hep C Ab negative  Endocrine:        Vitamin-D deficiency on vitamin D 5,000 units daily 01/2022 vitamin D 44 7   2013 DEXA scan normal     Genitourinary: Negative for difficulty urinating  Musculoskeletal: Positive for back pain  Negative for arthralgias, myalgias and neck pain  Chronic lower back pain  She receives monthly massages  Recent exacerbation of pain after bowling-pain radiating into left lateral hip and L anterior thigh  No leg weakness or numbness  She has using prn Tylenol/Advil/Bio Freeze for pain  Skin: Negative for rash  Allergic/Immunologic: Positive for environmental allergies  Seasonal allergies on Allegra daily  No prior allergy testing   Neurological: Negative for dizziness, weakness and headaches  Hematological: Negative for adenopathy  Does not bruise/bleed easily  Lab Results       Component                Value               Date                       WBC                      6  10                09/07/2022                 HGB                      14 2                09/07/2022                 HCT                      43 8                09/07/2022                 MCV                      100 (H)             09/07/2022                 PLT                      321                 09/07/2022                   WBC       Date                     Value               Ref Range           Status                01/17/2022               4 23 (L)            4 31 - 10 16 T*     Final                 07/26/2021               4 86                4 31 - 10 16 T*     Final                 11/30/2020 7 12                4 31 - 10 16 T*     Final                 Psychiatric/Behavioral: Negative for dysphoric mood and sleep disturbance  Current Outpatient Medications on File Prior to Visit   Medication Sig    albuterol (PROVENTIL HFA,VENTOLIN HFA) 90 mcg/act inhaler Inhale 2 puffs 4 (four) times a day as needed for wheezing    b complex vitamins capsule Take 1 capsule by mouth daily    Cholecalciferol (VITAMIN D3) 2000 units capsule Take 5,000 Units by mouth daily      fexofenadine (ALLEGRA) 60 MG tablet Take 1 tablet by mouth daily    hydrochlorothiazide (MICROZIDE) 12 5 mg capsule Take 1 capsule (12 5 mg total) by mouth daily    mometasone (Asmanex, 60 Metered Doses,) 220 MCG/INH inhaler Inhale 2 puffs daily Rinse mouth after use   montelukast (SINGULAIR) 10 mg tablet Take 1 tablet (10 mg total) by mouth daily at bedtime    Omega-3 Fatty Acids (FISH OIL) 1,000 mg 1 capsule by Does not apply route daily       Objective     /60   Pulse 76   Temp (!) 97 4 °F (36 3 °C)   Resp 16   Ht 5' 6 8" (1 697 m)   Wt 101 kg (222 lb)   SpO2 96%   BMI 34 98 kg/m²     BP Readings from Last 3 Encounters:   09/19/22 108/60   03/16/22 118/74   08/02/21 110/64     Wt Readings from Last 3 Encounters:   09/19/22 101 kg (222 lb)   03/16/22 98 4 kg (217 lb)   08/02/21 98 9 kg (218 lb)           Physical Exam  Vitals and nursing note reviewed  Constitutional:       General: She is not in acute distress  Appearance: She is well-developed  HENT:      Right Ear: Tympanic membrane and ear canal normal       Left Ear: Tympanic membrane and ear canal normal    Eyes:      General: No scleral icterus  Extraocular Movements: Extraocular movements intact  Conjunctiva/sclera: Conjunctivae normal       Pupils: Pupils are equal, round, and reactive to light  Neck:      Thyroid: No thyroid mass or thyromegaly  Vascular: No carotid bruit or JVD  Trachea: No tracheal deviation  Cardiovascular:      Rate and Rhythm: Normal rate and regular rhythm  Heart sounds: Normal heart sounds  No murmur heard  No gallop  Pulmonary:      Effort: Pulmonary effort is normal  No respiratory distress  Breath sounds: Normal breath sounds  No wheezing or rales  Chest:   Breasts:      Right: No supraclavicular adenopathy  Left: No supraclavicular adenopathy  Abdominal:      General: There is no abdominal bruit  Palpations: There is no hepatomegaly or splenomegaly  Musculoskeletal:         General: Tenderness present  Right lower leg: No edema  Left lower leg: No edema  Comments: Full ROM L hip  Mild tenderness L mid IT band  No tenderness over lumbar spine, SI area, L gluteal area or L trochanteric bursa area  Negative SLR  Lymphadenopathy:      Cervical: No cervical adenopathy  Upper Body:      Right upper body: No supraclavicular adenopathy  Left upper body: No supraclavicular adenopathy  Skin:     Findings: No rash  Nails: There is no clubbing  Neurological:      General: No focal deficit present  Mental Status: She is alert and oriented to person, place, and time  Sensory: No sensory deficit  Motor: No weakness        Deep Tendon Reflexes: Reflexes normal       Comments: Strength LEs normal  Dorsiflexion/plantar flexion normal     Psychiatric:         Mood and Affect: Mood normal        Karma Chatman MD

## 2022-09-21 ENCOUNTER — APPOINTMENT (OUTPATIENT)
Dept: RADIOLOGY | Facility: MEDICAL CENTER | Age: 62
End: 2022-09-21
Payer: COMMERCIAL

## 2022-09-21 DIAGNOSIS — G89.29 CHRONIC BILATERAL LOW BACK PAIN WITH LEFT-SIDED SCIATICA: ICD-10-CM

## 2022-09-21 DIAGNOSIS — M54.42 CHRONIC BILATERAL LOW BACK PAIN WITH LEFT-SIDED SCIATICA: ICD-10-CM

## 2022-09-21 PROCEDURE — 72110 X-RAY EXAM L-2 SPINE 4/>VWS: CPT

## 2022-10-04 ENCOUNTER — VBI (OUTPATIENT)
Dept: ADMINISTRATIVE | Facility: OTHER | Age: 62
End: 2022-10-04

## 2022-10-12 DIAGNOSIS — I10 ESSENTIAL HYPERTENSION: ICD-10-CM

## 2022-10-12 DIAGNOSIS — J45.909 ASTHMA, UNSPECIFIED ASTHMA SEVERITY, UNSPECIFIED WHETHER COMPLICATED, UNSPECIFIED WHETHER PERSISTENT: ICD-10-CM

## 2022-10-13 RX ORDER — MONTELUKAST SODIUM 10 MG/1
10 TABLET ORAL
Qty: 90 TABLET | Refills: 0 | Status: SHIPPED | OUTPATIENT
Start: 2022-10-13

## 2022-10-13 RX ORDER — HYDROCHLOROTHIAZIDE 12.5 MG/1
12.5 CAPSULE, GELATIN COATED ORAL DAILY
Qty: 90 CAPSULE | Refills: 0 | Status: SHIPPED | OUTPATIENT
Start: 2022-10-13

## 2022-11-27 DIAGNOSIS — J45.909 UNCOMPLICATED ASTHMA, UNSPECIFIED ASTHMA SEVERITY, UNSPECIFIED WHETHER PERSISTENT: ICD-10-CM

## 2022-11-29 ENCOUNTER — VBI (OUTPATIENT)
Dept: ADMINISTRATIVE | Facility: OTHER | Age: 62
End: 2022-11-29

## 2022-12-05 ENCOUNTER — VBI (OUTPATIENT)
Dept: ADMINISTRATIVE | Facility: OTHER | Age: 62
End: 2022-12-05

## 2022-12-13 DIAGNOSIS — J45.40 MODERATE PERSISTENT ASTHMA WITHOUT COMPLICATION: ICD-10-CM

## 2022-12-14 RX ORDER — ALBUTEROL SULFATE 90 UG/1
2 AEROSOL, METERED RESPIRATORY (INHALATION) 4 TIMES DAILY PRN
Qty: 18 G | Refills: 3 | Status: SHIPPED | OUTPATIENT
Start: 2022-12-14

## 2023-01-10 ENCOUNTER — VBI (OUTPATIENT)
Dept: ADMINISTRATIVE | Facility: OTHER | Age: 63
End: 2023-01-10

## 2023-01-12 DIAGNOSIS — I10 ESSENTIAL HYPERTENSION: ICD-10-CM

## 2023-01-12 DIAGNOSIS — J45.909 ASTHMA, UNSPECIFIED ASTHMA SEVERITY, UNSPECIFIED WHETHER COMPLICATED, UNSPECIFIED WHETHER PERSISTENT: ICD-10-CM

## 2023-01-13 RX ORDER — HYDROCHLOROTHIAZIDE 12.5 MG/1
12.5 CAPSULE, GELATIN COATED ORAL DAILY
Qty: 90 CAPSULE | Refills: 0 | Status: SHIPPED | OUTPATIENT
Start: 2023-01-13

## 2023-01-13 RX ORDER — MONTELUKAST SODIUM 10 MG/1
10 TABLET ORAL
Qty: 90 TABLET | Refills: 0 | Status: SHIPPED | OUTPATIENT
Start: 2023-01-13

## 2023-03-06 ENCOUNTER — APPOINTMENT (OUTPATIENT)
Dept: LAB | Facility: CLINIC | Age: 63
End: 2023-03-06

## 2023-03-06 LAB
ALBUMIN SERPL BCP-MCNC: 3.8 G/DL (ref 3.5–5)
ALP SERPL-CCNC: 56 U/L (ref 46–116)
ALT SERPL W P-5'-P-CCNC: 46 U/L (ref 12–78)
ANION GAP SERPL CALCULATED.3IONS-SCNC: 3 MMOL/L (ref 4–13)
AST SERPL W P-5'-P-CCNC: 26 U/L (ref 5–45)
BASOPHILS # BLD AUTO: 0.05 THOUSANDS/ÂΜL (ref 0–0.1)
BASOPHILS NFR BLD AUTO: 1 % (ref 0–1)
BILIRUB SERPL-MCNC: 0.69 MG/DL (ref 0.2–1)
BUN SERPL-MCNC: 22 MG/DL (ref 5–25)
CALCIUM SERPL-MCNC: 10.2 MG/DL (ref 8.3–10.1)
CHLORIDE SERPL-SCNC: 105 MMOL/L (ref 96–108)
CHOLEST SERPL-MCNC: 253 MG/DL
CO2 SERPL-SCNC: 29 MMOL/L (ref 21–32)
CREAT SERPL-MCNC: 0.82 MG/DL (ref 0.6–1.3)
EOSINOPHIL # BLD AUTO: 0.12 THOUSAND/ÂΜL (ref 0–0.61)
EOSINOPHIL NFR BLD AUTO: 2 % (ref 0–6)
ERYTHROCYTE [DISTWIDTH] IN BLOOD BY AUTOMATED COUNT: 14.6 % (ref 11.6–15.1)
GFR SERPL CREATININE-BSD FRML MDRD: 76 ML/MIN/1.73SQ M
GLUCOSE P FAST SERPL-MCNC: 91 MG/DL (ref 65–99)
HCT VFR BLD AUTO: 45.3 % (ref 34.8–46.1)
HDLC SERPL-MCNC: 55 MG/DL
HGB BLD-MCNC: 14.7 G/DL (ref 11.5–15.4)
IMM GRANULOCYTES # BLD AUTO: 0.01 THOUSAND/UL (ref 0–0.2)
IMM GRANULOCYTES NFR BLD AUTO: 0 % (ref 0–2)
LDLC SERPL CALC-MCNC: 156 MG/DL (ref 0–100)
LYMPHOCYTES # BLD AUTO: 1.71 THOUSANDS/ÂΜL (ref 0.6–4.47)
LYMPHOCYTES NFR BLD AUTO: 29 % (ref 14–44)
MCH RBC QN AUTO: 32.2 PG (ref 26.8–34.3)
MCHC RBC AUTO-ENTMCNC: 32.5 G/DL (ref 31.4–37.4)
MCV RBC AUTO: 99 FL (ref 82–98)
MONOCYTES # BLD AUTO: 0.44 THOUSAND/ÂΜL (ref 0.17–1.22)
MONOCYTES NFR BLD AUTO: 7 % (ref 4–12)
NEUTROPHILS # BLD AUTO: 3.68 THOUSANDS/ÂΜL (ref 1.85–7.62)
NEUTS SEG NFR BLD AUTO: 61 % (ref 43–75)
NONHDLC SERPL-MCNC: 198 MG/DL
NRBC BLD AUTO-RTO: 0 /100 WBCS
PLATELET # BLD AUTO: 327 THOUSANDS/UL (ref 149–390)
PMV BLD AUTO: 10.6 FL (ref 8.9–12.7)
POTASSIUM SERPL-SCNC: 4.1 MMOL/L (ref 3.5–5.3)
PROT SERPL-MCNC: 7.3 G/DL (ref 6.4–8.4)
RBC # BLD AUTO: 4.57 MILLION/UL (ref 3.81–5.12)
SODIUM SERPL-SCNC: 137 MMOL/L (ref 135–147)
TRIGL SERPL-MCNC: 208 MG/DL
WBC # BLD AUTO: 6.01 THOUSAND/UL (ref 4.31–10.16)

## 2023-03-09 ENCOUNTER — VBI (OUTPATIENT)
Dept: ADMINISTRATIVE | Facility: OTHER | Age: 63
End: 2023-03-09

## 2023-03-19 NOTE — PROGRESS NOTES
Name: Thomas Dumont      : 1960      MRN: [id number]  Encounter Provider: Lake Delgado MD  Encounter Date: 3/20/2023   [facility]     1  Primary hypertension    2  Mixed hyperlipidemia    3  Moderate persistent asthma without complication    4  Seasonal allergic rhinitis due to pollen    5  Lumbar degenerative disc disease    6  Facet arthritis of lumbar region    7  Vitamin D deficiency    Continue with current medications  Office visit in 6 months with repeat labs  Subjective     Follow up visit  Medications reviewed     Labs 2023 see note  Hypertension blood pressures have been stable on HCTZ 12 5 mg daily  Creatinine 0 82  GFR 76  Electrolytes normal except for Ca++ 10 2 not on Ca++ supplement    K + 4 1  Hemoglobin 14 7  Hyperlipidemia mixed type on Omega 3 fatty acids 1 capsule/day- lipid profile cholesterol 253 increased from 230  Triglycerides 208 increased from 198  HDL 55      LFTs normal   FBS 91  2020 TSH 2 830    Recent Results (from the past 504 hour(s))   Comprehensive metabolic panel    Collection Time: 23  9:12 AM   Result Value Ref Range    Sodium 137 135 - 147 mmol/L    Potassium 4 1 3 5 - 5 3 mmol/L    Chloride 105 96 - 108 mmol/L    CO2 29 21 - 32 mmol/L    ANION GAP 3 (L) 4 - 13 mmol/L    BUN 22 5 - 25 mg/dL    Creatinine 0 82 0 60 - 1 30 mg/dL    Glucose, Fasting 91 65 - 99 mg/dL    Calcium 10 2 (H) 8 3 - 10 1 mg/dL    AST 26 5 - 45 U/L    ALT 46 12 - 78 U/L    Alkaline Phosphatase 56 46 - 116 U/L    Total Protein 7 3 6 4 - 8 4 g/dL    Albumin 3 8 3 5 - 5 0 g/dL    Total Bilirubin 0 69 0 20 - 1 00 mg/dL    eGFR 76 ml/min/1 73sq m   CBC and differential    Collection Time: 23  9:12 AM   Result Value Ref Range    WBC 6 01 4 31 - 10 16 Thousand/uL    RBC 4 57 3 81 - 5 12 Million/uL    Hemoglobin 14 7 11 5 - 15 4 g/dL    Hematocrit 45 3 34 8 - 46 1 %    MCV 99 (H) 82 - 98 fL    MCH 32 2 26 8 - 34 3 pg    MCHC 32 5 31 4 - 37 4 g/dL    RDW 14 6 11 6 - 15 1 %    MPV 10 6 8 9 - 12 7 fL    Platelets 894 780 - 824 Thousands/uL    nRBC 0 /100 WBCs    Neutrophils Relative 61 43 - 75 %    Immat GRANS % 0 0 - 2 %    Lymphocytes Relative 29 14 - 44 %    Monocytes Relative 7 4 - 12 %    Eosinophils Relative 2 0 - 6 %    Basophils Relative 1 0 - 1 %    Neutrophils Absolute 3 68 1 85 - 7 62 Thousands/µL    Immature Grans Absolute 0 01 0 00 - 0 20 Thousand/uL    Lymphocytes Absolute 1 71 0 60 - 4 47 Thousands/µL    Monocytes Absolute 0 44 0 17 - 1 22 Thousand/µL    Eosinophils Absolute 0 12 0 00 - 0 61 Thousand/µL    Basophils Absolute 0 05 0 00 - 0 10 Thousands/µL   Lipid panel    Collection Time: 03/06/23  9:12 AM   Result Value Ref Range    Cholesterol 253 (H) See Comment mg/dL    Triglycerides 208 (H) See Comment mg/dL    HDL, Direct 55 >=50 mg/dL    LDL Calculated 156 (H) 0 - 100 mg/dL    Non-HDL-Chol (CHOL-HDL) 198 mg/dl     Lab Results   Component Value Date    [id number] 2 830 11/30/2020           Review of Systems   Constitutional: Negative for appetite change, chills, fatigue, fever and unexpected weight change  HENT: Negative for congestion, ear pain, rhinorrhea, sore throat and trouble swallowing  Eyes: Negative for visual disturbance  Respiratory: Negative for cough, shortness of breath and wheezing  Asthma stable on daily Asmanex and Singulair 10 mg daily and  ProAir generally once a day no nighttime symptoms   Cardiovascular: Negative for chest pain, palpitations and leg swelling  Gastrointestinal: Negative for abdominal pain, blood in stool, constipation, diarrhea, nausea and vomiting  No prior colonoscopy  07/2021 Hep C Ab negative  Endocrine:        Vitamin-D deficiency on vitamin D 5,000 units daily 09/2022 vitamin D 44 5   2013 DEXA scan normal     Genitourinary: Negative for difficulty urinating  Musculoskeletal: Positive for back pain   Negative for arthralgias, myalgias and neck pain  Chronic lower back pain  09/2022 x rays lumbar spine Moderate lumbar levoscoliosis with advanced multilevel degenerative changes as described  Grade 1 spondylolisthesis L4 on L5 secondary to facet arthropathy  She has using prn Tylenol/Advil/Bio Freeze for pain  Skin: Negative for rash  Allergic/Immunologic: Positive for environmental allergies  Seasonal allergies on Allegra daily  No prior allergy testing   Neurological: Negative for dizziness, weakness and headaches  Hematological: Negative for adenopathy  Does not bruise/bleed easily  Lab Results       Component                Value               Date                       WBC                      6  10                09/07/2022                 HGB                      14 2                09/07/2022                 HCT                      43 8                09/07/2022                 MCV                      100 (H)             09/07/2022                 PLT                      321                 09/07/2022                   WBC       Date                     Value               Ref Range           Status                01/17/2022               4 23 (L)            4 31 - 10 16 T*     Final                 07/26/2021               4 86                4 31 - 10 16 T*     Final                 11/30/2020               7 12                4 31 - 10 16 T*     Final                 Psychiatric/Behavioral: Negative for dysphoric mood and sleep disturbance         Past Medical History:   Diagnosis Date   • Asthma    • Fracture of patella     last assessed 11/5/2013   • Hypertension    • Pneumonia As child     Past Surgical History:   Procedure Laterality Date   • TONSILLECTOMY       Family History   Problem Relation Age of Onset   • Mitral valve prolapse Mother         Echo mitral valve systolic bileaflet prolapse   • Arthritis Mother    • Mitral valve prolapse Father         Echo mitral valve systolic bileaflet prolapse   • Hypertension Maternal Grandmother      Social History     Socioeconomic History   • Marital status: /Civil Union     Spouse name: None   • Number of children: None   • Years of education: None   • Highest education level: None   Occupational History   • None   Tobacco Use   • Smoking status: Never   • Smokeless tobacco: Never   Substance and Sexual Activity   • Alcohol use: Yes     Alcohol/week: 1 0 standard drink     Types: 1 Glasses of wine per week   • Drug use: No   • Sexual activity: None   Other Topics Concern   • None   Social History Narrative   • None     Social Determinants of Health     Financial Resource Strain: Not on file   Food Insecurity: Not on file   Transportation Needs: Not on file   Physical Activity: Not on file   Stress: Not on file   Social Connections: Not on file   Intimate Partner Violence: Not on file   Housing Stability: Not on file     Current Outpatient Medications on File Prior to Visit   Medication Sig   • albuterol (PROVENTIL HFA,VENTOLIN HFA) 90 mcg/act inhaler Inhale 2 puffs 4 (four) times a day as needed for wheezing   • b complex vitamins capsule Take 1 capsule by mouth daily   • Cholecalciferol (VITAMIN D3) 2000 units capsule Take 5,000 Units by mouth daily     • fexofenadine (ALLEGRA) 60 MG tablet Take 1 tablet by mouth daily   • hydrochlorothiazide (MICROZIDE) 12 5 mg capsule Take 1 capsule (12 5 mg total) by mouth daily   • mometasone 220 mcg/actuation inhaler Inhale 2 puffs daily Rinse mouth after use     • montelukast (SINGULAIR) 10 mg tablet Take 1 tablet (10 mg total) by mouth daily at bedtime   • Omega-3 Fatty Acids (FISH OIL) 1,000 mg 1 capsule by Does not apply route daily     Allergies   Allergen Reactions   • Banana - Food Allergy Throat Swelling     RAW BANANAS   • Salmeterol Shortness Of Breath   • Latex Rash     Immunization History   Administered Date(s) Administered   • Influenza Quadrivalent, 6-35 Months IM 10/13/2015, 10/24/2017   • Influenza, recombinant, quadrivalent,injectable, preservative free 11/15/2018, 12/02/2019, 12/15/2020, 11/10/2021   • Influenza, seasonal, injectable 10/11/2013, 10/06/2014, 11/19/2016   • Tdap 04/04/2013       Objective     /78 (BP Location: Left arm, Patient Position: Sitting, Cuff Size: Standard)   Pulse 78   Temp (!) 96 5 °F (35 8 °C) (Tympanic)   Resp 16   Ht 5' 6 8" (1 697 m)   Wt 99 8 kg (220 lb)   SpO2 97%   BMI 34 66 kg/m²       BP Readings from Last 3 Encounters:   03/20/23 130/78   09/19/22 108/60   03/16/22 118/74       Wt Readings from Last 3 Encounters:   03/20/23 99 8 kg (220 lb)   09/19/22 101 kg (222 lb)   03/16/22 98 4 kg (217 lb)       Physical Exam  Vitals and nursing note reviewed  Constitutional:       General: She is not in acute distress  Appearance: She is well-developed  HENT:      Right Ear: Tympanic membrane and ear canal normal       Left Ear: Tympanic membrane and ear canal normal    Eyes:      General: No scleral icterus  Extraocular Movements: Extraocular movements intact  Conjunctiva/sclera: Conjunctivae normal       Pupils: Pupils are equal, round, and reactive to light  Neck:      Thyroid: No thyroid mass or thyromegaly  Vascular: No carotid bruit or JVD  Trachea: No tracheal deviation  Cardiovascular:      Rate and Rhythm: Normal rate and regular rhythm  Heart sounds: Normal heart sounds  No murmur heard  No gallop  Pulmonary:      Effort: Pulmonary effort is normal  No respiratory distress  Breath sounds: Normal breath sounds  No wheezing or rales  Musculoskeletal:      Right lower leg: No edema  Left lower leg: No edema  Lymphadenopathy:      Cervical: No cervical adenopathy  Upper Body:      Right upper body: No supraclavicular adenopathy  Left upper body: No supraclavicular adenopathy  Skin:     Findings: No rash  Nails: There is no clubbing     Neurological:      General: No focal deficit present  Mental Status: She is alert and oriented to person, place, and time     Psychiatric:         Mood and Affect: Mood normal          Behavior: Behavior normal        Richmond Woodson MD

## 2023-03-20 ENCOUNTER — OFFICE VISIT (OUTPATIENT)
Dept: FAMILY MEDICINE CLINIC | Facility: CLINIC | Age: 63
End: 2023-03-20

## 2023-03-20 VITALS
OXYGEN SATURATION: 97 % | TEMPERATURE: 96.5 F | WEIGHT: 220 LBS | BODY MASS INDEX: 34.53 KG/M2 | RESPIRATION RATE: 16 BRPM | DIASTOLIC BLOOD PRESSURE: 78 MMHG | HEIGHT: 67 IN | HEART RATE: 78 BPM | SYSTOLIC BLOOD PRESSURE: 130 MMHG

## 2023-03-20 DIAGNOSIS — E78.2 MIXED HYPERLIPIDEMIA: ICD-10-CM

## 2023-03-20 DIAGNOSIS — I10 PRIMARY HYPERTENSION: Primary | ICD-10-CM

## 2023-03-20 DIAGNOSIS — E55.9 VITAMIN D DEFICIENCY: ICD-10-CM

## 2023-03-20 DIAGNOSIS — M51.36 LUMBAR DEGENERATIVE DISC DISEASE: ICD-10-CM

## 2023-03-20 DIAGNOSIS — J45.40 MODERATE PERSISTENT ASTHMA WITHOUT COMPLICATION: ICD-10-CM

## 2023-03-20 DIAGNOSIS — M47.816 FACET ARTHRITIS OF LUMBAR REGION: ICD-10-CM

## 2023-03-20 DIAGNOSIS — J30.1 SEASONAL ALLERGIC RHINITIS DUE TO POLLEN: ICD-10-CM

## 2023-04-04 ENCOUNTER — VBI (OUTPATIENT)
Dept: ADMINISTRATIVE | Facility: OTHER | Age: 63
End: 2023-04-04

## 2023-05-10 ENCOUNTER — VBI (OUTPATIENT)
Dept: ADMINISTRATIVE | Facility: OTHER | Age: 63
End: 2023-05-10

## 2023-06-15 ENCOUNTER — VBI (OUTPATIENT)
Dept: ADMINISTRATIVE | Facility: OTHER | Age: 63
End: 2023-06-15

## 2023-07-11 DIAGNOSIS — I10 ESSENTIAL HYPERTENSION: ICD-10-CM

## 2023-07-11 DIAGNOSIS — J45.909 ASTHMA, UNSPECIFIED ASTHMA SEVERITY, UNSPECIFIED WHETHER COMPLICATED, UNSPECIFIED WHETHER PERSISTENT: ICD-10-CM

## 2023-07-12 RX ORDER — MONTELUKAST SODIUM 10 MG/1
10 TABLET ORAL
Qty: 90 TABLET | Refills: 0 | Status: SHIPPED | OUTPATIENT
Start: 2023-07-12

## 2023-07-12 RX ORDER — HYDROCHLOROTHIAZIDE 12.5 MG/1
12.5 CAPSULE, GELATIN COATED ORAL DAILY
Qty: 90 CAPSULE | Refills: 0 | Status: SHIPPED | OUTPATIENT
Start: 2023-07-12

## 2023-08-04 ENCOUNTER — VBI (OUTPATIENT)
Dept: ADMINISTRATIVE | Facility: OTHER | Age: 63
End: 2023-08-04

## 2023-08-15 ENCOUNTER — VBI (OUTPATIENT)
Dept: ADMINISTRATIVE | Facility: OTHER | Age: 63
End: 2023-08-15

## 2023-09-12 ENCOUNTER — APPOINTMENT (OUTPATIENT)
Dept: LAB | Facility: CLINIC | Age: 63
End: 2023-09-12
Payer: COMMERCIAL

## 2023-09-12 LAB
25(OH)D3 SERPL-MCNC: 38.9 NG/ML (ref 30–100)
ALBUMIN SERPL BCP-MCNC: 4 G/DL (ref 3.5–5)
ALP SERPL-CCNC: 51 U/L (ref 34–104)
ALT SERPL W P-5'-P-CCNC: 41 U/L (ref 7–52)
ANION GAP SERPL CALCULATED.3IONS-SCNC: 5 MMOL/L
AST SERPL W P-5'-P-CCNC: 31 U/L (ref 13–39)
BASOPHILS # BLD AUTO: 0.04 THOUSANDS/ÂΜL (ref 0–0.1)
BASOPHILS NFR BLD AUTO: 1 % (ref 0–1)
BILIRUB SERPL-MCNC: 0.73 MG/DL (ref 0.2–1)
BUN SERPL-MCNC: 17 MG/DL (ref 5–25)
CALCIUM SERPL-MCNC: 9.4 MG/DL (ref 8.4–10.2)
CHLORIDE SERPL-SCNC: 105 MMOL/L (ref 96–108)
CHOLEST SERPL-MCNC: 229 MG/DL
CO2 SERPL-SCNC: 30 MMOL/L (ref 21–32)
CREAT SERPL-MCNC: 0.8 MG/DL (ref 0.6–1.3)
EOSINOPHIL # BLD AUTO: 0.12 THOUSAND/ÂΜL (ref 0–0.61)
EOSINOPHIL NFR BLD AUTO: 2 % (ref 0–6)
ERYTHROCYTE [DISTWIDTH] IN BLOOD BY AUTOMATED COUNT: 14.6 % (ref 11.6–15.1)
GFR SERPL CREATININE-BSD FRML MDRD: 79 ML/MIN/1.73SQ M
GLUCOSE P FAST SERPL-MCNC: 87 MG/DL (ref 65–99)
HCT VFR BLD AUTO: 45.1 % (ref 34.8–46.1)
HDLC SERPL-MCNC: 51 MG/DL
HGB BLD-MCNC: 14.4 G/DL (ref 11.5–15.4)
IMM GRANULOCYTES # BLD AUTO: 0.01 THOUSAND/UL (ref 0–0.2)
IMM GRANULOCYTES NFR BLD AUTO: 0 % (ref 0–2)
LDLC SERPL CALC-MCNC: 133 MG/DL (ref 0–100)
LYMPHOCYTES # BLD AUTO: 1.84 THOUSANDS/ÂΜL (ref 0.6–4.47)
LYMPHOCYTES NFR BLD AUTO: 31 % (ref 14–44)
MCH RBC QN AUTO: 31.4 PG (ref 26.8–34.3)
MCHC RBC AUTO-ENTMCNC: 31.9 G/DL (ref 31.4–37.4)
MCV RBC AUTO: 98 FL (ref 82–98)
MONOCYTES # BLD AUTO: 0.56 THOUSAND/ÂΜL (ref 0.17–1.22)
MONOCYTES NFR BLD AUTO: 9 % (ref 4–12)
NEUTROPHILS # BLD AUTO: 3.41 THOUSANDS/ÂΜL (ref 1.85–7.62)
NEUTS SEG NFR BLD AUTO: 57 % (ref 43–75)
NONHDLC SERPL-MCNC: 178 MG/DL
NRBC BLD AUTO-RTO: 0 /100 WBCS
PLATELET # BLD AUTO: 314 THOUSANDS/UL (ref 149–390)
PMV BLD AUTO: 10.6 FL (ref 8.9–12.7)
POTASSIUM SERPL-SCNC: 4.2 MMOL/L (ref 3.5–5.3)
PROT SERPL-MCNC: 6.8 G/DL (ref 6.4–8.4)
RBC # BLD AUTO: 4.59 MILLION/UL (ref 3.81–5.12)
SODIUM SERPL-SCNC: 140 MMOL/L (ref 135–147)
TRIGL SERPL-MCNC: 224 MG/DL
TSH SERPL DL<=0.05 MIU/L-ACNC: 2.11 UIU/ML (ref 0.45–4.5)
WBC # BLD AUTO: 5.98 THOUSAND/UL (ref 4.31–10.16)

## 2023-09-17 NOTE — PROGRESS NOTES
Name: Lamont Gilliam      : 1960      MRN: [id number]  Encounter Provider: Homa Bryan MD  Encounter Date: 2023   Encounter department: [address] [place] [address]     1. Primary hypertension  -     Comprehensive metabolic panel  -     CBC and differential    2. Mixed hyperlipidemia  -     Lipid panel    3. Moderate persistent asthma without complication    4. Seasonal allergic rhinitis due to pollen    5. Vitamin D deficiency    Continue with current medications. Increase Omega 3 fatty acids to 2/day. OV 6 months with labs. BMI Counseling: Body mass index is 34.66 kg/m². The BMI is above normal. Nutrition recommendations include consuming healthier snacks, moderation in carbohydrate intake, reducing intake of saturated fat and trans fat and reducing intake of cholesterol. Exercise recommendations include exercising 3-5 times per week. The 10-year ASCVD risk score (Aashish PONCE, et al., 2019) is: 6.2%    Values used to calculate the score:      Age: 58 years      Sex: Female      Is Non- : No      Diabetic: No      Tobacco smoker: No      Systolic Blood Pressure: 779 mmHg      Is BP treated: Yes      HDL Cholesterol: 51 mg/dL      Total Cholesterol: 229 mg/dL        Subjective     Follow up visit. Medications reviewed. .  Labs 2023 reviewed  see note. Hypertension blood pressures have been stable on HCTZ 12.5 mg daily. Creatinine 0.80. GFR 79. Electrolytes normal. K + 4.2. Hemoglobin 14.4. Hyperlipidemia mixed type on Omega 3 fatty acids 1 capsule/day- lipid profile cholesterol 229 decreased frm 253 Triglycerides 224 increased from 208. HDL 51. . LFTs normal.  FBS 87.  TSH 2.106    Recent Results (from the past 672 hour(s))   Vitamin D 25 hydroxy    Collection Time: 23  9:19 AM   Result Value Ref Range    Vit D, 25-Hydroxy 38.9 30.0 - 100.0 ng/mL   Comprehensive metabolic panel    Collection Time: 23  9:19 AM   Result Value Ref Range    Sodium 140 135 - 147 mmol/L    Potassium 4.2 3.5 - 5.3 mmol/L    Chloride 105 96 - 108 mmol/L    CO2 30 21 - 32 mmol/L    ANION GAP 5 mmol/L    BUN 17 5 - 25 mg/dL    Creatinine 0.80 0.60 - 1.30 mg/dL    Glucose, Fasting 87 65 - 99 mg/dL    Calcium 9.4 8.4 - 10.2 mg/dL    AST 31 13 - 39 U/L    ALT 41 7 - 52 U/L    Alkaline Phosphatase 51 34 - 104 U/L    Total Protein 6.8 6.4 - 8.4 g/dL    Albumin 4.0 3.5 - 5.0 g/dL    Total Bilirubin 0.73 0.20 - 1.00 mg/dL    eGFR 79 ml/min/1.73sq m   CBC and differential    Collection Time: 09/12/23  9:19 AM   Result Value Ref Range    WBC 5.98 4.31 - 10.16 Thousand/uL    RBC 4.59 3.81 - 5.12 Million/uL    Hemoglobin 14.4 11.5 - 15.4 g/dL    Hematocrit 45.1 34.8 - 46.1 %    MCV 98 82 - 98 fL    MCH 31.4 26.8 - 34.3 pg    MCHC 31.9 31.4 - 37.4 g/dL    RDW 14.6 11.6 - 15.1 %    MPV 10.6 8.9 - 12.7 fL    Platelets 171 040 - 293 Thousands/uL    nRBC 0 /100 WBCs    Neutrophils Relative 57 43 - 75 %    Immat GRANS % 0 0 - 2 %    Lymphocytes Relative 31 14 - 44 %    Monocytes Relative 9 4 - 12 %    Eosinophils Relative 2 0 - 6 %    Basophils Relative 1 0 - 1 %    Neutrophils Absolute 3.41 1.85 - 7.62 Thousands/µL    Immature Grans Absolute 0.01 0.00 - 0.20 Thousand/uL    Lymphocytes Absolute 1.84 0.60 - 4.47 Thousands/µL    Monocytes Absolute 0.56 0.17 - 1.22 Thousand/µL    Eosinophils Absolute 0.12 0.00 - 0.61 Thousand/µL    Basophils Absolute 0.04 0.00 - 0.10 Thousands/µL   Lipid panel    Collection Time: 09/12/23  9:19 AM   Result Value Ref Range    Cholesterol 229 (H) See Comment mg/dL    Triglycerides 224 (H) See Comment mg/dL    HDL, Direct 51 >=50 mg/dL    LDL Calculated 133 (H) 0 - 100 mg/dL    Non-HDL-Chol (CHOL-HDL) 178 mg/dl   TSH, 3rd generation with Free T4 reflex    Collection Time: 09/12/23  9:19 AM   Result Value Ref Range    TSH 3RD GENERATON 2.106 0.450 - 4.500 uIU/mL           Review of Systems   Constitutional: Negative for appetite change, chills, fatigue, fever and unexpected weight change. HENT: Negative for congestion, ear pain, rhinorrhea, sore throat and trouble swallowing. Eyes: Negative for visual disturbance. Respiratory: Negative for cough, shortness of breath and wheezing. Asthma stable on daily Asmanex and Singulair 10 mg daily and  ProAir generally once a day    Cardiovascular: Negative for chest pain, palpitations and leg swelling. Gastrointestinal: Negative for abdominal pain, blood in stool, constipation, diarrhea, nausea and vomiting. No prior colonoscopy. 07/2021 Hep C Ab negative. Endocrine:        Vitamin-D deficiency on vitamin D 5,000 units daily 09/2023  vitamin D 38.8.  2013 DEXA scan normal.    Genitourinary: Negative for difficulty urinating. Musculoskeletal: Positive for back pain. Negative for arthralgias, myalgias and neck pain. Chronic lower back pain. 09/2022 x rays lumbar spine Moderate lumbar levoscoliosis with advanced multilevel degenerative changes as described. Grade 1 spondylolisthesis L4 on L5 secondary to facet arthropathy. She has using prn Tylenol/Advil/Bio Freeze for pain. Skin: Negative for rash. Allergic/Immunologic: Positive for environmental allergies. Seasonal allergies on Allegra daily. No prior allergy testing   Neurological: Negative for dizziness, weakness and headaches. Hematological: Negative for adenopathy. Does not bruise/bleed easily.         Lab Results       Component                Value               Date                       WBC                      5.98                09/12/2023                 HGB                      14.4                09/12/2023                 HCT                      45.1                09/12/2023                 MCV                      98                  09/12/2023                 PLT                      314                 09/12/2023                      WBC       Date                     Value               Ref Range Status                01/17/2022               4.23 (L)            4.31 - 10.16 T*     Final                 07/26/2021               4.86                4.31 - 10.16 T*     Final                 11/30/2020               7.12                4.31 - 10.16 T*     Final                 Psychiatric/Behavioral: Negative for dysphoric mood and sleep disturbance. Past Medical History:   Diagnosis Date   • Asthma    • Fracture of patella     last assessed 11/5/2013   • Hypertension    • Pneumonia As child     Past Surgical History:   Procedure Laterality Date   • TONSILLECTOMY       Family History   Problem Relation Age of Onset   • Mitral valve prolapse Mother         Echo mitral valve systolic bileaflet prolapse   • Arthritis Mother    • Mitral valve prolapse Father         Echo mitral valve systolic bileaflet prolapse   • Hypertension Maternal Grandmother      Social History     Socioeconomic History   • Marital status: /Civil Union     Spouse name: None   • Number of children: None   • Years of education: None   • Highest education level: None   Occupational History   • None   Tobacco Use   • Smoking status: Never   • Smokeless tobacco: Never   Substance and Sexual Activity   • Alcohol use:  Yes     Alcohol/week: 1.0 standard drink of alcohol     Types: 1 Glasses of wine per week   • Drug use: No   • Sexual activity: None   Other Topics Concern   • None   Social History Narrative   • None     Social Determinants of Health     Financial Resource Strain: Not on file   Food Insecurity: Not on file   Transportation Needs: Not on file   Physical Activity: Not on file   Stress: Not on file   Social Connections: Not on file   Intimate Partner Violence: Not on file   Housing Stability: Not on file     Current Outpatient Medications on File Prior to Visit   Medication Sig   • albuterol (PROVENTIL HFA,VENTOLIN HFA) 90 mcg/act inhaler Inhale 2 puffs 4 (four) times a day as needed for wheezing   • b complex vitamins capsule Take 1 capsule by mouth daily   • Cholecalciferol (VITAMIN D3) 2000 units capsule Take 5,000 Units by mouth daily     • fexofenadine (ALLEGRA) 60 MG tablet Take 1 tablet by mouth daily   • hydrochlorothiazide (MICROZIDE) 12.5 mg capsule Take 1 capsule (12.5 mg total) by mouth daily   • mometasone 220 mcg/actuation inhaler Inhale 2 puffs daily Rinse mouth after use. • montelukast (SINGULAIR) 10 mg tablet Take 1 tablet (10 mg total) by mouth daily at bedtime   • Omega-3 Fatty Acids (FISH OIL) 1,000 mg 1 capsule by Does not apply route daily     Allergies   Allergen Reactions   • Banana - Food Allergy Throat Swelling     RAW BANANAS   • Salmeterol Shortness Of Breath   • Latex Rash     Immunization History   Administered Date(s) Administered   • Influenza Quadrivalent, 6-35 Months IM 10/13/2015, 10/24/2017   • Influenza, recombinant, quadrivalent,injectable, preservative free 11/15/2018, 12/02/2019, 12/15/2020, 11/10/2021   • Influenza, seasonal, injectable 10/11/2013, 10/06/2014, 11/19/2016   • Tdap 04/04/2013       Objective     /66 (BP Location: Left arm, Patient Position: Sitting, Cuff Size: Large)   Pulse 63   Temp (!) 96.9 °F (36.1 °C)   Ht 5' 6.8" (1.697 m)   Wt 99.8 kg (220 lb)   SpO2 97%   BMI 34.66 kg/m²     BP Readings from Last 3 Encounters:   09/18/23 128/66   03/20/23 130/78   09/19/22 108/60     Wt Readings from Last 3 Encounters:   09/18/23 99.8 kg (220 lb)   03/20/23 99.8 kg (220 lb)   09/19/22 101 kg (222 lb)       Physical Exam  Vitals and nursing note reviewed. Constitutional:       General: She is not in acute distress. Appearance: She is well-developed. HENT:      Right Ear: Tympanic membrane and ear canal normal.      Left Ear: Tympanic membrane and ear canal normal.   Eyes:      General: No scleral icterus. Extraocular Movements: Extraocular movements intact.       Conjunctiva/sclera: Conjunctivae normal.      Pupils: Pupils are equal, round, and reactive to light. Neck:      Thyroid: No thyroid mass or thyromegaly. Vascular: No carotid bruit or JVD. Trachea: No tracheal deviation. Cardiovascular:      Rate and Rhythm: Normal rate and regular rhythm. Heart sounds: Normal heart sounds. No murmur heard. No gallop. Pulmonary:      Effort: Pulmonary effort is normal. No respiratory distress. Breath sounds: Normal breath sounds. No wheezing or rales. Musculoskeletal:      Right lower leg: No edema. Left lower leg: No edema. Lymphadenopathy:      Cervical: No cervical adenopathy. Upper Body:      Right upper body: No supraclavicular adenopathy. Left upper body: No supraclavicular adenopathy. Skin:     Findings: No rash. Nails: There is no clubbing. Neurological:      General: No focal deficit present. Mental Status: She is alert and oriented to person, place, and time.    Psychiatric:         Mood and Affect: Mood normal.       Citlalli Ritter MD

## 2023-09-18 ENCOUNTER — OFFICE VISIT (OUTPATIENT)
Dept: FAMILY MEDICINE CLINIC | Facility: CLINIC | Age: 63
End: 2023-09-18
Payer: COMMERCIAL

## 2023-09-18 VITALS
HEIGHT: 67 IN | BODY MASS INDEX: 34.53 KG/M2 | HEART RATE: 63 BPM | DIASTOLIC BLOOD PRESSURE: 66 MMHG | WEIGHT: 220 LBS | SYSTOLIC BLOOD PRESSURE: 128 MMHG | OXYGEN SATURATION: 97 % | TEMPERATURE: 96.9 F

## 2023-09-18 DIAGNOSIS — I10 PRIMARY HYPERTENSION: Primary | ICD-10-CM

## 2023-09-18 DIAGNOSIS — J30.1 SEASONAL ALLERGIC RHINITIS DUE TO POLLEN: ICD-10-CM

## 2023-09-18 DIAGNOSIS — E78.2 MIXED HYPERLIPIDEMIA: ICD-10-CM

## 2023-09-18 DIAGNOSIS — J45.40 MODERATE PERSISTENT ASTHMA WITHOUT COMPLICATION: ICD-10-CM

## 2023-09-18 DIAGNOSIS — E55.9 VITAMIN D DEFICIENCY: ICD-10-CM

## 2023-09-18 PROCEDURE — 99214 OFFICE O/P EST MOD 30 MIN: CPT | Performed by: FAMILY MEDICINE

## 2023-10-11 DIAGNOSIS — I10 ESSENTIAL HYPERTENSION: ICD-10-CM

## 2023-10-11 DIAGNOSIS — J45.909 ASTHMA, UNSPECIFIED ASTHMA SEVERITY, UNSPECIFIED WHETHER COMPLICATED, UNSPECIFIED WHETHER PERSISTENT: ICD-10-CM

## 2023-10-11 RX ORDER — HYDROCHLOROTHIAZIDE 12.5 MG/1
12.5 CAPSULE, GELATIN COATED ORAL DAILY
Qty: 90 CAPSULE | Refills: 1 | Status: SHIPPED | OUTPATIENT
Start: 2023-10-11

## 2023-10-11 RX ORDER — MONTELUKAST SODIUM 10 MG/1
10 TABLET ORAL
Qty: 90 TABLET | Refills: 1 | Status: SHIPPED | OUTPATIENT
Start: 2023-10-11

## 2023-11-24 ENCOUNTER — OFFICE VISIT (OUTPATIENT)
Dept: FAMILY MEDICINE CLINIC | Facility: CLINIC | Age: 63
End: 2023-11-24
Payer: COMMERCIAL

## 2023-11-24 VITALS
HEART RATE: 68 BPM | HEIGHT: 67 IN | TEMPERATURE: 97.6 F | DIASTOLIC BLOOD PRESSURE: 76 MMHG | WEIGHT: 218.5 LBS | OXYGEN SATURATION: 98 % | BODY MASS INDEX: 34.29 KG/M2 | RESPIRATION RATE: 18 BRPM | SYSTOLIC BLOOD PRESSURE: 124 MMHG

## 2023-11-24 DIAGNOSIS — L03.011 PARONYCHIA OF RIGHT THUMB: Primary | ICD-10-CM

## 2023-11-24 PROCEDURE — 99213 OFFICE O/P EST LOW 20 MIN: CPT | Performed by: FAMILY MEDICINE

## 2023-11-24 PROCEDURE — 26010 DRAINAGE OF FINGER ABSCESS: CPT | Performed by: FAMILY MEDICINE

## 2023-11-24 RX ORDER — CEPHALEXIN 500 MG/1
500 CAPSULE ORAL
Qty: 14 CAPSULE | Refills: 0 | Status: SHIPPED | OUTPATIENT
Start: 2023-11-24 | End: 2023-12-01

## 2023-11-24 NOTE — PROGRESS NOTES
Name: Marques Page      : 1960      MRN: [id number]  Encounter Provider: Tg Camacho MD  Encounter Date: 2023   Encounter department: [address] [place] [address]     1. Paronychia of right thumb  -     cephalexin (KEFLEX) 500 mg capsule; Take 1 capsule (500 mg total) by mouth 2 (two) times daily after meals for 7 days  -     Incision and Drainage    Start antibiotics  No topical treatments. Advised to call if any changes. A significant but separately identifiable additional service rendered during the encounter I and D paronychia R thumb see procedure note. Depression Screening and Follow-up Plan: Patient was screened for depression during today's encounter. They screened negative with a PHQ-2 score of 0. Subjective     Patient has been treating a wart on her R thumb w/ OTC product Salicylic acid tape. She developed pain and swelling of R thumb at. No drainage. No T. Review of Systems   Constitutional:  Negative for fever. Musculoskeletal:  Positive for joint swelling.         See HPI    Skin:         See HPI        Past Medical History:   Diagnosis Date    Asthma     Fracture of patella     last assessed 2013    Hypertension     Pneumonia As child     Past Surgical History:   Procedure Laterality Date    TONSILLECTOMY       Family History   Problem Relation Age of Onset    Mitral valve prolapse Mother         Echo mitral valve systolic bileaflet prolapse    Arthritis Mother     Mitral valve prolapse Father         Echo mitral valve systolic bileaflet prolapse    Hypertension Maternal Grandmother      Social History     Socioeconomic History    Marital status: /Civil Union     Spouse name: None    Number of children: None    Years of education: None    Highest education level: None   Occupational History    None   Tobacco Use    Smoking status: Never    Smokeless tobacco: Never   Vaping Use    Vaping Use: Never used   Substance and Sexual Activity    Alcohol use:  Yes     Alcohol/week: 1.0 standard drink of alcohol     Types: 1 Glasses of wine per week    Drug use: No    Sexual activity: None   Other Topics Concern    None   Social History Narrative    None     Social Determinants of Health     Financial Resource Strain: Not on file   Food Insecurity: Not on file   Transportation Needs: Not on file   Physical Activity: Not on file   Stress: Not on file   Social Connections: Not on file   Intimate Partner Violence: Not on file   Housing Stability: Not on file     Current Outpatient Medications on File Prior to Visit   Medication Sig    albuterol (PROVENTIL HFA,VENTOLIN HFA) 90 mcg/act inhaler Inhale 2 puffs 4 (four) times a day as needed for wheezing    b complex vitamins capsule Take 1 capsule by mouth daily    Cholecalciferol (VITAMIN D3) 2000 units capsule Take 5,000 Units by mouth daily      fexofenadine (ALLEGRA) 60 MG tablet Take 1 tablet by mouth daily    hydrochlorothiazide (MICROZIDE) 12.5 mg capsule Take 1 capsule (12.5 mg total) by mouth daily    mometasone 220 mcg/actuation inhaler Inhale 2 puffs daily Rinse mouth after use.    montelukast (SINGULAIR) 10 mg tablet Take 1 tablet (10 mg total) by mouth daily at bedtime    Omega-3 Fatty Acids (FISH OIL) 1,000 mg 1 capsule by Does not apply route daily     Allergies   Allergen Reactions    Banana - Food Allergy Throat Swelling     RAW BANANAS    Salmeterol Shortness Of Breath    Latex Rash     Immunization History   Administered Date(s) Administered    Influenza Quadrivalent, 6-35 Months IM 10/13/2015, 10/24/2017    Influenza, recombinant, quadrivalent,injectable, preservative free 11/15/2018, 12/02/2019, 12/15/2020, 11/10/2021    Influenza, seasonal, injectable 10/11/2013, 10/06/2014, 11/19/2016    Tdap 04/04/2013       Objective     /76 (BP Location: Left arm, Patient Position: Sitting, Cuff Size: Large)   Pulse 68   Temp 97.6 °F (36.4 °C)   Resp 18   Ht 5' 6.8" (1.697 m)   Wt 99.1 kg (218 lb 8 oz)   SpO2 98%   BMI 34.43 kg/m²     Physical Exam  Constitutional:       General: She is not in acute distress. Musculoskeletal:      Comments: Paronychia base of right thumb tender on palpation. Distal R thumb redness and swelling palmar surface. Flexion and extension R thumb DIP normal.    Neurological:      Mental Status: She is alert. Incision and Drainage    Date/Time: 11/24/2023 11:30 AM    Performed by: Gómez Robert MD  Authorized by: Gómez Robert MD  Universal Protocol:  Consent: Verbal consent obtained. Risks and benefits: risks, benefits and alternatives were discussed  Consent given by: patient  Timeout called at: 11/24/2023 11:30 PM.    Patient location:  Clinic  Location:     Type:  Abscess (paronychia R thumb)    Location:  Upper extremity    Upper extremity location:  R thumb  Pre-procedure details:     Skin preparation:  Betadine  Anesthesia (see MAR for exact dosages): Anesthesia method:  Topical application    Topical anesthesia: Ethyl chloride. Procedure details:     Complexity:  Simple    Incision types:  Stab incision (22 G needle)    Approach:  Puncture    Incision depth:  Subcutaneous    Drainage:  Purulent and bloody    Drainage amount:  Scant    Wound treatment:  Wound left open  Post-procedure details:     Patient tolerance of procedure:   Tolerated well, no immediate complications      Gómez Robert MD

## 2023-12-11 DIAGNOSIS — J45.909 UNCOMPLICATED ASTHMA, UNSPECIFIED ASTHMA SEVERITY, UNSPECIFIED WHETHER PERSISTENT: ICD-10-CM

## 2023-12-15 ENCOUNTER — VBI (OUTPATIENT)
Dept: ADMINISTRATIVE | Facility: OTHER | Age: 63
End: 2023-12-15

## 2024-01-02 DIAGNOSIS — J45.40 MODERATE PERSISTENT ASTHMA WITHOUT COMPLICATION: ICD-10-CM

## 2024-01-03 RX ORDER — ALBUTEROL SULFATE 90 UG/1
AEROSOL, METERED RESPIRATORY (INHALATION)
Qty: 18 G | Refills: 3 | Status: SHIPPED | OUTPATIENT
Start: 2024-01-03

## 2024-03-07 ENCOUNTER — TELEPHONE (OUTPATIENT)
Age: 64
End: 2024-03-07

## 2024-03-07 DIAGNOSIS — J45.909 UNCOMPLICATED ASTHMA, UNSPECIFIED ASTHMA SEVERITY, UNSPECIFIED WHETHER PERSISTENT: ICD-10-CM

## 2024-03-07 RX ORDER — MOMETASONE FUROATE 50 UG/1
2 SPRAY, METERED NASAL DAILY
Qty: 17 G | Refills: 3 | Status: SHIPPED | OUTPATIENT
Start: 2024-03-07

## 2024-03-07 RX ORDER — MOMETASONE FUROATE 50 UG/1
SPRAY, METERED NASAL
Qty: 17 G | Refills: 3 | Status: SHIPPED | OUTPATIENT
Start: 2024-03-07 | End: 2024-03-07 | Stop reason: SDUPTHER

## 2024-03-07 NOTE — TELEPHONE ENCOUNTER
Please order the inhaler not the nasal spray. Please send to [facility] in [place] on [address]. Please reorder the   mometasone 220 mcg/actuation inhaler

## 2024-03-08 DIAGNOSIS — J45.909 UNCOMPLICATED ASTHMA, UNSPECIFIED ASTHMA SEVERITY, UNSPECIFIED WHETHER PERSISTENT: ICD-10-CM

## 2024-03-18 ENCOUNTER — APPOINTMENT (OUTPATIENT)
Dept: LAB | Facility: CLINIC | Age: 64
End: 2024-03-18
Payer: COMMERCIAL

## 2024-03-18 LAB
ALBUMIN SERPL BCP-MCNC: 4 G/DL (ref 3.5–5)
ALP SERPL-CCNC: 51 U/L (ref 34–104)
ALT SERPL W P-5'-P-CCNC: 29 U/L (ref 7–52)
ANION GAP SERPL CALCULATED.3IONS-SCNC: 7 MMOL/L (ref 4–13)
AST SERPL W P-5'-P-CCNC: 25 U/L (ref 13–39)
BASOPHILS # BLD AUTO: 0.04 THOUSANDS/ÂΜL (ref 0–0.1)
BASOPHILS NFR BLD AUTO: 1 % (ref 0–1)
BILIRUB SERPL-MCNC: 0.74 MG/DL (ref 0.2–1)
BUN SERPL-MCNC: 22 MG/DL (ref 5–25)
CALCIUM SERPL-MCNC: 9.6 MG/DL (ref 8.4–10.2)
CHLORIDE SERPL-SCNC: 103 MMOL/L (ref 96–108)
CHOLEST SERPL-MCNC: 225 MG/DL
CO2 SERPL-SCNC: 30 MMOL/L (ref 21–32)
CREAT SERPL-MCNC: 0.84 MG/DL (ref 0.6–1.3)
EOSINOPHIL # BLD AUTO: 0.17 THOUSAND/ÂΜL (ref 0–0.61)
EOSINOPHIL NFR BLD AUTO: 3 % (ref 0–6)
ERYTHROCYTE [DISTWIDTH] IN BLOOD BY AUTOMATED COUNT: 14.5 % (ref 11.6–15.1)
GFR SERPL CREATININE-BSD FRML MDRD: 74 ML/MIN/1.73SQ M
GLUCOSE P FAST SERPL-MCNC: 84 MG/DL (ref 65–99)
HCT VFR BLD AUTO: 44.9 % (ref 34.8–46.1)
HDLC SERPL-MCNC: 51 MG/DL
HGB BLD-MCNC: 14.4 G/DL (ref 11.5–15.4)
IMM GRANULOCYTES # BLD AUTO: 0.03 THOUSAND/UL (ref 0–0.2)
IMM GRANULOCYTES NFR BLD AUTO: 0 % (ref 0–2)
LDLC SERPL CALC-MCNC: 136 MG/DL (ref 0–100)
LYMPHOCYTES # BLD AUTO: 2.21 THOUSANDS/ÂΜL (ref 0.6–4.47)
LYMPHOCYTES NFR BLD AUTO: 33 % (ref 14–44)
MCH RBC QN AUTO: 31.7 PG (ref 26.8–34.3)
MCHC RBC AUTO-ENTMCNC: 32.1 G/DL (ref 31.4–37.4)
MCV RBC AUTO: 99 FL (ref 82–98)
MONOCYTES # BLD AUTO: 0.54 THOUSAND/ÂΜL (ref 0.17–1.22)
MONOCYTES NFR BLD AUTO: 8 % (ref 4–12)
NEUTROPHILS # BLD AUTO: 3.7 THOUSANDS/ÂΜL (ref 1.85–7.62)
NEUTS SEG NFR BLD AUTO: 55 % (ref 43–75)
NONHDLC SERPL-MCNC: 174 MG/DL
NRBC BLD AUTO-RTO: 0 /100 WBCS
PLATELET # BLD AUTO: 328 THOUSANDS/UL (ref 149–390)
PMV BLD AUTO: 11 FL (ref 8.9–12.7)
POTASSIUM SERPL-SCNC: 3.8 MMOL/L (ref 3.5–5.3)
PROT SERPL-MCNC: 6.7 G/DL (ref 6.4–8.4)
RBC # BLD AUTO: 4.54 MILLION/UL (ref 3.81–5.12)
SODIUM SERPL-SCNC: 140 MMOL/L (ref 135–147)
TRIGL SERPL-MCNC: 190 MG/DL
WBC # BLD AUTO: 6.69 THOUSAND/UL (ref 4.31–10.16)

## 2024-03-25 ENCOUNTER — OFFICE VISIT (OUTPATIENT)
Dept: FAMILY MEDICINE CLINIC | Facility: CLINIC | Age: 64
End: 2024-03-25
Payer: COMMERCIAL

## 2024-03-25 VITALS
SYSTOLIC BLOOD PRESSURE: 130 MMHG | WEIGHT: 217 LBS | HEIGHT: 67 IN | DIASTOLIC BLOOD PRESSURE: 72 MMHG | RESPIRATION RATE: 17 BRPM | TEMPERATURE: 97.7 F | BODY MASS INDEX: 34.06 KG/M2 | HEART RATE: 71 BPM | OXYGEN SATURATION: 98 %

## 2024-03-25 DIAGNOSIS — E78.2 MIXED HYPERLIPIDEMIA: ICD-10-CM

## 2024-03-25 DIAGNOSIS — J45.40 MODERATE PERSISTENT ASTHMA WITHOUT COMPLICATION: ICD-10-CM

## 2024-03-25 DIAGNOSIS — I10 PRIMARY HYPERTENSION: Primary | ICD-10-CM

## 2024-03-25 PROCEDURE — 99214 OFFICE O/P EST MOD 30 MIN: CPT | Performed by: FAMILY MEDICINE

## 2024-03-25 NOTE — PROGRESS NOTES
Name: Araseli Shelton      : 1960      MRN: [id number]  Encounter Provider: Butch Grace MD  Encounter Date: 3/25/2024   Encounter department: [facility]    Assessment & Plan     1. Primary hypertension  -     Comprehensive metabolic panel  -     CBC and differential    2. Mixed hyperlipidemia  -     Lipid panel    3. Moderate persistent asthma without complication    Continue with current medications.  OV 6 months with labs     BMI Counseling: Body mass index is 34.5 kg/m². The BMI is above normal. Nutrition recommendations include consuming healthier snacks, moderation in carbohydrate intake, reducing intake of saturated fat and trans fat, and reducing intake of cholesterol.        Subjective     Follow up visit for chronic medical problems.  Medications reviewed..  Labs 2024 reviewed  see note.     Hypertension blood pressures have been stable on HCTZ 12.5 mg daily. Creatinine 0.84. GFR 74.  Electrolytes normal. K + 3.8. Hemoglobin 14.4.     Hyperlipidemia mixed type on Omega 3 fatty acids 1 capsule/day- lipid profile cholesterol 225 decreased frm 253. Triglycerides 190 decreased from 224.  HDL 51. .  LFTs normal.  FBS 84. 2023 TSH 2.106      Recent Results (from the past 504 hour(s))   Comprehensive metabolic panel    Collection Time: 24  8:26 AM   Result Value Ref Range    Sodium 140 135 - 147 mmol/L    Potassium 3.8 3.5 - 5.3 mmol/L    Chloride 103 96 - 108 mmol/L    CO2 30 21 - 32 mmol/L    ANION GAP 7 4 - 13 mmol/L    BUN 22 5 - 25 mg/dL    Creatinine 0.84 0.60 - 1.30 mg/dL    Glucose, Fasting 84 65 - 99 mg/dL    Calcium 9.6 8.4 - 10.2 mg/dL    AST 25 13 - 39 U/L    ALT 29 7 - 52 U/L    Alkaline Phosphatase 51 34 - 104 U/L    Total Protein 6.7 6.4 - 8.4 g/dL    Albumin 4.0 3.5 - 5.0 g/dL    Total Bilirubin 0.74 0.20 - 1.00 mg/dL    eGFR 74 ml/min/1.73sq m   CBC and differential    Collection Time: 24  8:26 AM   Result Value Ref Range    WBC 6.69 4.31 -  10.16 Thousand/uL    RBC 4.54 3.81 - 5.12 Million/uL    Hemoglobin 14.4 11.5 - 15.4 g/dL    Hematocrit 44.9 34.8 - 46.1 %    MCV 99 (H) 82 - 98 fL    MCH 31.7 26.8 - 34.3 pg    MCHC 32.1 31.4 - 37.4 g/dL    RDW 14.5 11.6 - 15.1 %    MPV 11.0 8.9 - 12.7 fL    Platelets 328 149 - 390 Thousands/uL    nRBC 0 /100 WBCs    Neutrophils Relative 55 43 - 75 %    Immature Grans % 0 0 - 2 %    Lymphocytes Relative 33 14 - 44 %    Monocytes Relative 8 4 - 12 %    Eosinophils Relative 3 0 - 6 %    Basophils Relative 1 0 - 1 %    Neutrophils Absolute 3.70 1.85 - 7.62 Thousands/µL    Absolute Immature Grans 0.03 0.00 - 0.20 Thousand/uL    Absolute Lymphocytes 2.21 0.60 - 4.47 Thousands/µL    Absolute Monocytes 0.54 0.17 - 1.22 Thousand/µL    Eosinophils Absolute 0.17 0.00 - 0.61 Thousand/µL    Basophils Absolute 0.04 0.00 - 0.10 Thousands/µL   Lipid panel    Collection Time: 03/18/24  8:26 AM   Result Value Ref Range    Cholesterol 225 (H) See Comment mg/dL    Triglycerides 190 (H) See Comment mg/dL    HDL, Direct 51 >=50 mg/dL    LDL Calculated 136 (H) 0 - 100 mg/dL    Non-HDL-Chol (CHOL-HDL) 174 mg/dl     Lab Results   Component Value Date    [id number] 2.106 09/12/2023         Review of Systems   Constitutional:  Positive for unexpected weight change (3 lb weight loss from 09/2023). Negative for appetite change, chills, fatigue and fever.   HENT:  Negative for congestion, ear pain, rhinorrhea, sore throat and trouble swallowing.    Eyes:  Negative for visual disturbance.   Respiratory:  Negative for cough, shortness of breath and wheezing.         Asthma stable on daily Asmanex,Singulair 10 mg daily and ProAir once a day    Cardiovascular:  Negative for chest pain, palpitations and leg swelling.   Gastrointestinal:  Negative for abdominal pain, blood in stool, constipation, diarrhea, nausea and vomiting.        No prior colonoscopy. 07/2021 Hep C Ab negative.    Endocrine:        Vitamin-D deficiency on vitamin D 5,000 units  daily 09/2023  vitamin D 38.8.  2013 DEXA scan normal.    Genitourinary:  Negative for difficulty urinating.   Musculoskeletal:  Positive for back pain. Negative for arthralgias, myalgias and neck pain.        Chronic lower back pain.  09/2022 x rays lumbar spine Moderate lumbar levoscoliosis with advanced multilevel degenerative changes as described. Grade 1 spondylolisthesis L4 on L5 secondary to facet arthropathy.  She has using prn Tylenol/Advil/Bio Freeze for pain.    Skin:  Negative for rash.   Allergic/Immunologic: Positive for environmental allergies.        Seasonal allergies on Allegra daily.  No prior allergy testing   Neurological:  Negative for dizziness, weakness and headaches.   Hematological:  Negative for adenopathy. Does not bruise/bleed easily.        Lab Results       Component                Value               Date                       WBC                      6.69                03/18/2024                 HGB                      14.4                03/18/2024                 HCT                      44.9                03/18/2024                 MCV                      99 (H)              03/18/2024                 PLT                      328                 03/18/2024                        WBC       Date                     Value               Ref Range           Status                01/17/2022               4.23 (L)            4.31 - 10.16 T*     Final                 07/26/2021               4.86                4.31 - 10.16 T*     Final                 11/30/2020               7.12                4.31 - 10.16 T*     Final                 Psychiatric/Behavioral:  Negative for dysphoric mood and sleep disturbance.        Past Medical History:   Diagnosis Date    Asthma     Fracture of patella     last assessed 11/5/2013    Hypertension     Pneumonia As child     Past Surgical History:   Procedure Laterality Date    TONSILLECTOMY       Family History   Problem Relation Age of Onset     Mitral valve prolapse Mother         Echo mitral valve systolic bileaflet prolapse    Arthritis Mother     Mitral valve prolapse Father         Echo mitral valve systolic bileaflet prolapse    Hypertension Maternal Grandmother      Social History     Socioeconomic History    Marital status: /Civil Union     Spouse name: None    Number of children: None    Years of education: None    Highest education level: None   Occupational History    None   Tobacco Use    Smoking status: Never    Smokeless tobacco: Never   Vaping Use    Vaping status: Never Used   Substance and Sexual Activity    Alcohol use: Yes     Alcohol/week: 1.0 standard drink of alcohol     Types: 1 Glasses of wine per week    Drug use: No    Sexual activity: None   Other Topics Concern    None   Social History Narrative    None     Social Determinants of Health     Financial Resource Strain: Not on file   Food Insecurity: Not on file   Transportation Needs: Not on file   Physical Activity: Not on file   Stress: Not on file   Social Connections: Not on file   Intimate Partner Violence: Not on file   Housing Stability: Not on file     Current Outpatient Medications on File Prior to Visit   Medication Sig    albuterol (PROVENTIL HFA,VENTOLIN HFA) 90 mcg/act inhaler INHALE 2 PUFFS BY MOUTH 4 TIMES A DAY AS NEEDED FOR WHEEZING.    b complex vitamins capsule Take 1 capsule by mouth daily    Cholecalciferol (VITAMIN D3) 2000 units capsule Take 5,000 Units by mouth daily      fexofenadine (ALLEGRA) 60 MG tablet Take 1 tablet by mouth daily    hydrochlorothiazide (MICROZIDE) 12.5 mg capsule Take 1 capsule (12.5 mg total) by mouth daily    mometasone 220 mcg/actuation inhaler Inhale 2 puffs daily Rinse mouth after use.    montelukast (SINGULAIR) 10 mg tablet Take 1 tablet (10 mg total) by mouth daily at bedtime    Omega-3 Fatty Acids (FISH OIL) 1,000 mg 1 capsule by Does not apply route daily    [DISCONTINUED] mometasone (NASONEX) 50 mcg/act nasal spray 2  "sprays into each nostril daily     Allergies   Allergen Reactions    Banana - Food Allergy Throat Swelling     RAW BANANAS    Salmeterol Shortness Of Breath    Latex Rash     Immunization History   Administered Date(s) Administered    Influenza Quadrivalent, 6-35 Months IM 10/13/2015, 10/24/2017    Influenza, recombinant, quadrivalent,injectable, preservative free 11/15/2018, 12/02/2019, 12/15/2020, 11/10/2021    Influenza, seasonal, injectable 10/11/2013, 10/06/2014, 11/19/2016    Tdap 04/04/2013       Objective     /72 (BP Location: Left arm, Patient Position: Sitting, Cuff Size: Standard)   Pulse 71   Temp 97.7 °F (36.5 °C) (Temporal)   Resp 17   Ht 5' 6.5\" (1.689 m)   Wt 98.4 kg (217 lb)   SpO2 98%   BMI 34.50 kg/m²      BP Readings from Last 3 Encounters:   03/25/24 130/72   11/24/23 124/76   09/18/23 128/66      Wt Readings from Last 3 Encounters:   03/25/24 98.4 kg (217 lb)   11/24/23 99.1 kg (218 lb 8 oz)   09/18/23 99.8 kg (220 lb)          Physical Exam  Vitals and nursing note reviewed.   Constitutional:       General: She is not in acute distress.     Appearance: She is well-developed.   HENT:      Right Ear: Tympanic membrane and ear canal normal.      Left Ear: Tympanic membrane and ear canal normal.      Mouth/Throat:      Mouth: No oral lesions.      Pharynx: Oropharynx is clear.   Eyes:      General: No scleral icterus.     Extraocular Movements: Extraocular movements intact.      Conjunctiva/sclera: Conjunctivae normal.      Pupils: Pupils are equal, round, and reactive to light.   Neck:      Thyroid: No thyroid mass or thyromegaly.      Vascular: No carotid bruit or JVD.      Trachea: No tracheal deviation.   Cardiovascular:      Rate and Rhythm: Normal rate and regular rhythm.      Heart sounds: Normal heart sounds. No murmur heard.     No gallop.   Pulmonary:      Effort: Pulmonary effort is normal. No respiratory distress.      Breath sounds: Normal breath sounds. No wheezing or " rales.   Musculoskeletal:      Right lower leg: No edema.      Left lower leg: No edema.   Lymphadenopathy:      Cervical: No cervical adenopathy.      Upper Body:      Right upper body: No supraclavicular adenopathy.      Left upper body: No supraclavicular adenopathy.   Skin:     Findings: No rash.      Nails: There is no clubbing.   Neurological:      General: No focal deficit present.      Mental Status: She is alert and oriented to person, place, and time.   Psychiatric:         Mood and Affect: Mood normal.       Butch Grace MD

## 2024-04-04 DIAGNOSIS — I10 ESSENTIAL HYPERTENSION: ICD-10-CM

## 2024-04-04 DIAGNOSIS — J45.909 ASTHMA, UNSPECIFIED ASTHMA SEVERITY, UNSPECIFIED WHETHER COMPLICATED, UNSPECIFIED WHETHER PERSISTENT: ICD-10-CM

## 2024-04-04 RX ORDER — HYDROCHLOROTHIAZIDE 12.5 MG/1
12.5 CAPSULE, GELATIN COATED ORAL DAILY
Qty: 90 CAPSULE | Refills: 1 | Status: SHIPPED | OUTPATIENT
Start: 2024-04-04

## 2024-04-04 RX ORDER — MONTELUKAST SODIUM 10 MG/1
10 TABLET ORAL
Qty: 90 TABLET | Refills: 1 | Status: SHIPPED | OUTPATIENT
Start: 2024-04-04

## 2024-05-16 ENCOUNTER — VBI (OUTPATIENT)
Dept: ADMINISTRATIVE | Facility: OTHER | Age: 64
End: 2024-05-16

## 2024-08-13 ENCOUNTER — VBI (OUTPATIENT)
Dept: ADMINISTRATIVE | Facility: OTHER | Age: 64
End: 2024-08-13

## 2024-08-13 NOTE — TELEPHONE ENCOUNTER
08/13/24 9:11 AM     Chart reviewed for Pap Smear (HPV) aka Cervical Cancer Screening ; nothing is submitted to the patient's insurance at this time.     Karina Torres MA   [facility]

## 2024-09-09 ENCOUNTER — APPOINTMENT (OUTPATIENT)
Dept: LAB | Facility: CLINIC | Age: 64
End: 2024-09-09
Payer: COMMERCIAL

## 2024-09-09 LAB
ALBUMIN SERPL BCG-MCNC: 4 G/DL (ref 3.5–5)
ALP SERPL-CCNC: 58 U/L (ref 34–104)
ALT SERPL W P-5'-P-CCNC: 48 U/L (ref 7–52)
ANION GAP SERPL CALCULATED.3IONS-SCNC: 9 MMOL/L (ref 4–13)
AST SERPL W P-5'-P-CCNC: 30 U/L (ref 13–39)
BASOPHILS # BLD AUTO: 0.04 THOUSANDS/ÂΜL (ref 0–0.1)
BASOPHILS NFR BLD AUTO: 1 % (ref 0–1)
BILIRUB SERPL-MCNC: 0.82 MG/DL (ref 0.2–1)
BUN SERPL-MCNC: 17 MG/DL (ref 5–25)
CALCIUM SERPL-MCNC: 9.6 MG/DL (ref 8.4–10.2)
CHLORIDE SERPL-SCNC: 102 MMOL/L (ref 96–108)
CHOLEST SERPL-MCNC: 223 MG/DL
CO2 SERPL-SCNC: 30 MMOL/L (ref 21–32)
CREAT SERPL-MCNC: 0.78 MG/DL (ref 0.6–1.3)
EOSINOPHIL # BLD AUTO: 0.14 THOUSAND/ÂΜL (ref 0–0.61)
EOSINOPHIL NFR BLD AUTO: 3 % (ref 0–6)
ERYTHROCYTE [DISTWIDTH] IN BLOOD BY AUTOMATED COUNT: 14.6 % (ref 11.6–15.1)
GFR SERPL CREATININE-BSD FRML MDRD: 81 ML/MIN/1.73SQ M
GLUCOSE P FAST SERPL-MCNC: 86 MG/DL (ref 65–99)
HCT VFR BLD AUTO: 43.9 % (ref 34.8–46.1)
HDLC SERPL-MCNC: 48 MG/DL
HGB BLD-MCNC: 14.4 G/DL (ref 11.5–15.4)
IMM GRANULOCYTES # BLD AUTO: 0.02 THOUSAND/UL (ref 0–0.2)
IMM GRANULOCYTES NFR BLD AUTO: 0 % (ref 0–2)
LDLC SERPL CALC-MCNC: 147 MG/DL (ref 0–100)
LYMPHOCYTES # BLD AUTO: 1.99 THOUSANDS/ÂΜL (ref 0.6–4.47)
LYMPHOCYTES NFR BLD AUTO: 36 % (ref 14–44)
MCH RBC QN AUTO: 32.1 PG (ref 26.8–34.3)
MCHC RBC AUTO-ENTMCNC: 32.8 G/DL (ref 31.4–37.4)
MCV RBC AUTO: 98 FL (ref 82–98)
MONOCYTES # BLD AUTO: 0.64 THOUSAND/ÂΜL (ref 0.17–1.22)
MONOCYTES NFR BLD AUTO: 12 % (ref 4–12)
NEUTROPHILS # BLD AUTO: 2.64 THOUSANDS/ÂΜL (ref 1.85–7.62)
NEUTS SEG NFR BLD AUTO: 48 % (ref 43–75)
NONHDLC SERPL-MCNC: 175 MG/DL
NRBC BLD AUTO-RTO: 0 /100 WBCS
PLATELET # BLD AUTO: 291 THOUSANDS/UL (ref 149–390)
PMV BLD AUTO: 10.3 FL (ref 8.9–12.7)
POTASSIUM SERPL-SCNC: 3.6 MMOL/L (ref 3.5–5.3)
PROT SERPL-MCNC: 6.7 G/DL (ref 6.4–8.4)
RBC # BLD AUTO: 4.48 MILLION/UL (ref 3.81–5.12)
SODIUM SERPL-SCNC: 141 MMOL/L (ref 135–147)
TRIGL SERPL-MCNC: 142 MG/DL
WBC # BLD AUTO: 5.47 THOUSAND/UL (ref 4.31–10.16)

## 2024-09-16 ENCOUNTER — RA CDI HCC (OUTPATIENT)
Dept: OTHER | Facility: HOSPITAL | Age: 64
End: 2024-09-16

## 2024-09-16 NOTE — PROGRESS NOTES
HCC coding opportunities          Chart Reviewed number of suggestions sent to Provider: 1   J45.909    Patients Insurance        Commercial Insurance: [employer]

## 2024-09-23 ENCOUNTER — OFFICE VISIT (OUTPATIENT)
Dept: FAMILY MEDICINE CLINIC | Facility: CLINIC | Age: 64
End: 2024-09-23
Payer: COMMERCIAL

## 2024-09-23 VITALS
HEART RATE: 73 BPM | BODY MASS INDEX: 34.21 KG/M2 | TEMPERATURE: 97.7 F | HEIGHT: 67 IN | OXYGEN SATURATION: 94 % | WEIGHT: 218 LBS | DIASTOLIC BLOOD PRESSURE: 74 MMHG | RESPIRATION RATE: 16 BRPM | SYSTOLIC BLOOD PRESSURE: 124 MMHG

## 2024-09-23 DIAGNOSIS — J45.40 MODERATE PERSISTENT ASTHMA WITHOUT COMPLICATION: ICD-10-CM

## 2024-09-23 DIAGNOSIS — I10 PRIMARY HYPERTENSION: Primary | ICD-10-CM

## 2024-09-23 DIAGNOSIS — M25.50 PAIN IN JOINT, MULTIPLE SITES: ICD-10-CM

## 2024-09-23 DIAGNOSIS — E78.2 MIXED HYPERLIPIDEMIA: ICD-10-CM

## 2024-09-23 PROCEDURE — 99214 OFFICE O/P EST MOD 30 MIN: CPT | Performed by: FAMILY MEDICINE

## 2024-09-23 NOTE — PROGRESS NOTES
Ambulatory Visit  Name: Araseli Shelton      : 1960      MRN: [id number]  Encounter Provider: Butch Grace MD  Encounter Date: 2024   Encounter department: [facility]    Assessment & Plan  Primary hypertension         Mixed hyperlipidemia         Moderate persistent asthma without complication         Pain in joint, multiple sites    Orders:    C-reactive protein; Future    Sedimentation rate, automated; Future    Sjogren's Antibodies; Future    Cyclic citrul peptide antibody, IgG; Future    RF Screen w/ Reflex to Titer; Future    GIOVANNI Screen w/ Reflex to Titer/Pattern; Future    Lyme Total AB W Reflex to IGM/IGG; Future      Continue with current medications.  Office visit 6 months with labs    Labs this month to evaluate joint pains       History of Present Illness     Follow up visit for chronic medical problems.  Medications reviewed.    Hypertension blood pressures have been stable on HCTZ 12.5 mg daily. 2024 Creatinine 0.78. GFR 81.  Electrolytes normal. K + 3.6. Hemoglobin 14.4.     Hyperlipidemia mixed type on Omega 3 fatty acids 1 capsule/day. 2024 lipid profile cholesterol 223 decreased frm 253. Triglycerides 142 decreased from 224.  HDL 48. .  LFTs normal.  FBS 86. 2023 TSH 2.106      Recent Results (from the past 672 hour(s))   Comprehensive metabolic panel    Collection Time: 24  8:23 AM   Result Value Ref Range    Sodium 141 135 - 147 mmol/L    Potassium 3.6 3.5 - 5.3 mmol/L    Chloride 102 96 - 108 mmol/L    CO2 30 21 - 32 mmol/L    ANION GAP 9 4 - 13 mmol/L    BUN 17 5 - 25 mg/dL    Creatinine 0.78 0.60 - 1.30 mg/dL    Glucose, Fasting 86 65 - 99 mg/dL    Calcium 9.6 8.4 - 10.2 mg/dL    AST 30 13 - 39 U/L    ALT 48 7 - 52 U/L    Alkaline Phosphatase 58 34 - 104 U/L    Total Protein 6.7 6.4 - 8.4 g/dL    Albumin 4.0 3.5 - 5.0 g/dL    Total Bilirubin 0.82 0.20 - 1.00 mg/dL    eGFR 81 ml/min/1.73sq m   CBC and differential    Collection Time:  09/09/24  8:23 AM   Result Value Ref Range    WBC 5.47 4.31 - 10.16 Thousand/uL    RBC 4.48 3.81 - 5.12 Million/uL    Hemoglobin 14.4 11.5 - 15.4 g/dL    Hematocrit 43.9 34.8 - 46.1 %    MCV 98 82 - 98 fL    MCH 32.1 26.8 - 34.3 pg    MCHC 32.8 31.4 - 37.4 g/dL    RDW 14.6 11.6 - 15.1 %    MPV 10.3 8.9 - 12.7 fL    Platelets 291 149 - 390 Thousands/uL    nRBC 0 /100 WBCs    Segmented % 48 43 - 75 %    Immature Grans % 0 0 - 2 %    Lymphocytes % 36 14 - 44 %    Monocytes % 12 4 - 12 %    Eosinophils Relative 3 0 - 6 %    Basophils Relative 1 0 - 1 %    Absolute Neutrophils 2.64 1.85 - 7.62 Thousands/µL    Absolute Immature Grans 0.02 0.00 - 0.20 Thousand/uL    Absolute Lymphocytes 1.99 0.60 - 4.47 Thousands/µL    Absolute Monocytes 0.64 0.17 - 1.22 Thousand/µL    Eosinophils Absolute 0.14 0.00 - 0.61 Thousand/µL    Basophils Absolute 0.04 0.00 - 0.10 Thousands/µL   Lipid panel    Collection Time: 09/09/24  8:23 AM   Result Value Ref Range    Cholesterol 223 (H) See Comment mg/dL    Triglycerides 142 See Comment mg/dL    HDL, Direct 48 (L) >=50 mg/dL    LDL Calculated 147 (H) 0 - 100 mg/dL    Non-HDL-Chol (CHOL-HDL) 175 mg/dl     Lab Results   Component Value Date    FVD1OTEYICIC 2.106 09/12/2023           Review of Systems   Constitutional:  Negative for appetite change, chills, fatigue, fever and unexpected weight change.   HENT:  Negative for congestion, ear pain, rhinorrhea, sore throat and trouble swallowing.    Eyes:  Negative for visual disturbance.   Respiratory:  Negative for cough, shortness of breath and wheezing.         Asthma stable on daily Asmanex,Singulair 10 mg daily and ProAir once a day    Cardiovascular:  Negative for chest pain, palpitations and leg swelling.   Gastrointestinal:  Negative for abdominal pain, blood in stool, constipation, diarrhea, nausea and vomiting.        No prior colonoscopy. 07/2021 Hep C Ab negative.    Endocrine:        Vitamin-D deficiency on vitamin D 5,000 units daily  09/2023  vitamin D 38.8.  2013 DEXA scan normal.    Genitourinary:  Negative for difficulty urinating.   Musculoskeletal:  Positive for arthralgias and back pain. Negative for myalgias and neck pain.        Several month history of increased joint pains primarily in wrists, hands.  AM stiffness and pain lasting 1/2-hour     Chronic lower back pain.  09/2022 x rays lumbar spine Moderate lumbar levoscoliosis with advanced multilevel degenerative changes as described. Grade 1 spondylolisthesis L4 on L5 secondary to facet arthropathy.  She has using prn Tylenol/Advil/Bio Freeze for pain.    Skin:  Negative for rash.   Allergic/Immunologic: Positive for environmental allergies.        Seasonal allergies on Allegra daily.  No prior allergy testing   Neurological:  Negative for dizziness, weakness and headaches.   Hematological:  Negative for adenopathy. Does not bruise/bleed easily.        Lab Results       Component                Value               Date                       WBC                      5.47                09/09/2024                 HGB                      14.4                09/09/2024                 HCT                      43.9                09/09/2024                 MCV                      98                  09/09/2024                 PLT                      291                 09/09/2024                                   WBC       Date                     Value               Ref Range           Status                01/17/2022               4.23 (L)            4.31 - 10.16 T*     Final                 07/26/2021               4.86                4.31 - 10.16 T*     Final                 11/30/2020               7.12                4.31 - 10.16 T*     Final                 Psychiatric/Behavioral:  Negative for dysphoric mood and sleep disturbance.      Past Medical History:   Diagnosis Date    Asthma     Fracture of patella     last assessed 11/5/2013    Hypertension     Pneumonia As child      Past Surgical History:   Procedure Laterality Date    TONSILLECTOMY       Family History   Problem Relation Age of Onset    Mitral valve prolapse Mother         Echo mitral valve systolic bileaflet prolapse    Arthritis Mother     Mitral valve prolapse Father         Echo mitral valve systolic bileaflet prolapse    Hypertension Maternal Grandmother      Social History     Tobacco Use    Smoking status: Never    Smokeless tobacco: Never   Vaping Use    Vaping status: Never Used   Substance and Sexual Activity    Alcohol use: Yes     Alcohol/week: 1.0 standard drink of alcohol     Types: 1 Glasses of wine per week    Drug use: No    Sexual activity: Not on file     Current Outpatient Medications on File Prior to Visit   Medication Sig    albuterol (PROVENTIL HFA,VENTOLIN HFA) 90 mcg/act inhaler INHALE 2 PUFFS BY MOUTH 4 TIMES A DAY AS NEEDED FOR WHEEZING.    b complex vitamins capsule Take 1 capsule by mouth daily    Cholecalciferol (VITAMIN D3) 2000 units capsule Take 5,000 Units by mouth daily      fexofenadine (ALLEGRA) 60 MG tablet Take 1 tablet by mouth daily    hydroCHLOROthiazide 12.5 mg capsule TAKE 1 CAPSULE BY MOUTH EVERY DAY    mometasone 220 mcg/actuation inhaler Inhale 2 puffs daily Rinse mouth after use.    montelukast (SINGULAIR) 10 mg tablet TAKE 1 TABLET BY MOUTH DAILY AT BEDTIME    Multiple Vitamins-Minerals (OCUVITE PO) Take by mouth    Omega-3 Fatty Acids (FISH OIL) 1,000 mg 1 capsule by Does not apply route daily     Allergies   Allergen Reactions    Banana - Food Allergy Throat Swelling     RAW BANANAS    Salmeterol Shortness Of Breath    Latex Rash     Immunization History   Administered Date(s) Administered    Influenza Quadrivalent, 6-35 Months IM 10/13/2015, 10/24/2017    Influenza, recombinant, quadrivalent,injectable, preservative free 11/15/2018, 12/02/2019, 12/15/2020, 11/10/2021    Influenza, seasonal, injectable 10/11/2013, 10/06/2014, 11/19/2016    Tdap 04/04/2013     Objective  "      /74 (BP Location: Left arm, Patient Position: Sitting, Cuff Size: Large)   Pulse 73   Temp 97.7 °F (36.5 °C)   Resp 16   Ht 5' 6.5\" (1.689 m)   Wt 98.9 kg (218 lb)   SpO2 94%   BMI 34.66 kg/m²      BP Readings from Last 3 Encounters:   09/23/24 124/74   03/25/24 130/72   11/24/23 124/76     Wt Readings from Last 3 Encounters:   09/23/24 98.9 kg (218 lb)   03/25/24 98.4 kg (217 lb)   11/24/23 99.1 kg (218 lb 8 oz)         Physical Exam  Constitutional:       General: She is not in acute distress.  HENT:      Right Ear: Tympanic membrane and ear canal normal.      Left Ear: Tympanic membrane and ear canal normal.      Mouth/Throat:      Mouth: No oral lesions.      Pharynx: Oropharynx is clear.   Eyes:      General: No scleral icterus.     Extraocular Movements: Extraocular movements intact.      Conjunctiva/sclera: Conjunctivae normal.      Pupils: Pupils are equal, round, and reactive to light.   Neck:      Vascular: No carotid bruit.   Cardiovascular:      Rate and Rhythm: Normal rate and regular rhythm.      Heart sounds: No murmur heard.     No gallop.   Pulmonary:      Effort: Pulmonary effort is normal.      Breath sounds: Normal breath sounds. No wheezing or rales.   Musculoskeletal:      Right wrist: No swelling, deformity, tenderness or bony tenderness. Normal range of motion.      Left wrist: No swelling, deformity, tenderness or bony tenderness. Normal range of motion.      Right hand: Deformity present. No swelling, tenderness or bony tenderness. Normal range of motion.      Left hand: No swelling, deformity or tenderness. Normal range of motion.      Right lower leg: No edema.      Left lower leg: No edema.      Comments: Heberden's nodes both hands.  Isabel's node right index finger   Lymphadenopathy:      Cervical: No cervical adenopathy.      Upper Body:      Right upper body: No supraclavicular adenopathy.      Left upper body: No supraclavicular adenopathy.   Skin:     Findings: " No rash.   Neurological:      General: No focal deficit present.      Mental Status: She is alert and oriented to person, place, and time.   Psychiatric:         Mood and Affect: Mood normal.

## 2024-09-24 ENCOUNTER — APPOINTMENT (OUTPATIENT)
Dept: LAB | Facility: CLINIC | Age: 64
End: 2024-09-24
Payer: COMMERCIAL

## 2024-09-24 DIAGNOSIS — M25.50 PAIN IN JOINT, MULTIPLE SITES: ICD-10-CM

## 2024-09-24 LAB
ANA SER QL IA: NEGATIVE
B BURGDOR IGG+IGM SER QL IA: NEGATIVE
CRP SERPL QL: 3.7 MG/L
CRYOGLOB RF SER-ACNC: ABNORMAL [IU]/ML
ERYTHROCYTE [SEDIMENTATION RATE] IN BLOOD: 38 MM/HOUR (ref 0–29)
RHEUMATOID FACT SER QL LA: POSITIVE

## 2024-09-24 PROCEDURE — 85652 RBC SED RATE AUTOMATED: CPT

## 2024-09-24 PROCEDURE — 86618 LYME DISEASE ANTIBODY: CPT

## 2024-09-24 PROCEDURE — 86235 NUCLEAR ANTIGEN ANTIBODY: CPT

## 2024-09-24 PROCEDURE — 86431 RHEUMATOID FACTOR QUANT: CPT

## 2024-09-24 PROCEDURE — 86430 RHEUMATOID FACTOR TEST QUAL: CPT

## 2024-09-24 PROCEDURE — 86038 ANTINUCLEAR ANTIBODIES: CPT

## 2024-09-24 PROCEDURE — 86140 C-REACTIVE PROTEIN: CPT

## 2024-09-24 PROCEDURE — 36415 COLL VENOUS BLD VENIPUNCTURE: CPT

## 2024-09-24 PROCEDURE — 86200 CCP ANTIBODY: CPT

## 2024-09-25 LAB
ENA SS-A AB SER-ACNC: <0.2 AI (ref 0–0.9)
ENA SS-B AB SER-ACNC: <0.2 AI (ref 0–0.9)

## 2024-09-27 DIAGNOSIS — R76.8 RHEUMATOID FACTOR POSITIVE WITH CYCLIC CITRULLINATED PEPTIDE (CCP) ANTIBODY NEGATIVE: Primary | ICD-10-CM

## 2024-09-27 LAB — CCP AB SER IA-ACNC: 0.8

## 2024-09-29 DIAGNOSIS — J45.909 ASTHMA, UNSPECIFIED ASTHMA SEVERITY, UNSPECIFIED WHETHER COMPLICATED, UNSPECIFIED WHETHER PERSISTENT: ICD-10-CM

## 2024-09-29 DIAGNOSIS — I10 ESSENTIAL HYPERTENSION: ICD-10-CM

## 2024-09-29 RX ORDER — HYDROCHLOROTHIAZIDE 12.5 MG/1
12.5 CAPSULE ORAL DAILY
Qty: 90 CAPSULE | Refills: 1 | Status: SHIPPED | OUTPATIENT
Start: 2024-09-29

## 2024-09-29 RX ORDER — MONTELUKAST SODIUM 10 MG/1
10 TABLET ORAL
Qty: 90 TABLET | Refills: 1 | Status: SHIPPED | OUTPATIENT
Start: 2024-09-29

## 2024-11-25 ENCOUNTER — OFFICE VISIT (OUTPATIENT)
Dept: RHEUMATOLOGY | Facility: CLINIC | Age: 64
End: 2024-11-25
Payer: COMMERCIAL

## 2024-11-25 VITALS
DIASTOLIC BLOOD PRESSURE: 80 MMHG | BODY MASS INDEX: 34.53 KG/M2 | HEIGHT: 67 IN | TEMPERATURE: 98.1 F | HEART RATE: 100 BPM | WEIGHT: 220 LBS | SYSTOLIC BLOOD PRESSURE: 128 MMHG | OXYGEN SATURATION: 96 %

## 2024-11-25 DIAGNOSIS — M15.0 PRIMARY GENERALIZED (OSTEO)ARTHRITIS: Primary | ICD-10-CM

## 2024-11-25 DIAGNOSIS — R76.8 RHEUMATOID FACTOR POSITIVE WITH CYCLIC CITRULLINATED PEPTIDE (CCP) ANTIBODY NEGATIVE: ICD-10-CM

## 2024-11-25 PROCEDURE — 99204 OFFICE O/P NEW MOD 45 MIN: CPT | Performed by: STUDENT IN AN ORGANIZED HEALTH CARE EDUCATION/TRAINING PROGRAM

## 2024-11-25 NOTE — PATIENT INSTRUCTIONS
Your joint pain is caused by osteoarthritis. This is wear-and-tear, degenerative arthritis that happens over time. Your genetics can also make you more likely to get it. The biggest risk factors for developing osteoarthritis are being overweight and repetitive use of the joint. There are several things that can help to manage the pain.      1. Weight loss and exercise improve joint pain and function. If you lose 5% of your body weight, this can increase the function of your joint by 25%. The best exercises are low-impact such as swimming/water aerobics, elliptical, walking, and biking.   2. Tylenol is the best initial treatment. You should take this scheduled throughout the day as follows: 650 mg (tylenol arthritis) three times per day or 500 mg (extra strength) two tablets three times per day. Do not take more than 3,000 mg of tylenol in 24 hours.   3. NSAIDs such as ibuprofen, naproxen, diclofenac, meloxicam, celecoxib are useful for pain as well. You cannot take more than one NSAID medication at a time as it may harm your kidneys. Some of these may upset your stomach, so take them with food.   4. Topical therapy with Voltaren gel (over-the-counter). You can rub this on your joints up to 4 times per day. It works best on the small joints such as the hands and feet.   5. Other things to try: Paraffin baths for hands (can buy this at the pharmacy or online), topical capsaicin cream, Blue Emu cream    6. Cymbalta is a medication that can be used for arthritis pain if you do not get enough relief from these other measures.

## 2024-11-25 NOTE — PROGRESS NOTES
Name: Araseli Shelton      : 1960      MRN: [id number]  Encounter Provider: Iman Perez DO  Encounter Date: 2024   Encounter department: [facility]  :  Assessment & Plan  Primary generalized (osteo)arthritis  Patient is a 64-year-old female presenting for further evaluation of pain in bilateral hands and lower back.  Denies any joint swelling.  Patient reports pain with prolonged immobility, however it improves after few minutes of movement.  Patient reports that she has morning stiffness for about 30 minutes in her hands and lower back most days, however sometimes it can be as long as 60 minutes.  Patient takes Tylenol or NSAIDs as needed which provide some relief.  On exam, patient has no synovitis or joint tenderness.  Physical exam findings consistent with osteoarthritis.  Previous workup showed RF 8 and CCP negative.  Based on history and physical exam, suspect pain is likely due to osteoarthritis.  Low clinical suspicion for inflammatory arthritis at this point.  Discussed conservative options for treatment.  -Discussed Tylenol, no more than 3 g per day, for OA related pain  - Discussed Voltaren gel and paraffin wax baths  -Offered physical therapy which patient deferred for now   -Discussed with patient that there may be a possible component of inflammatory OA, could consider trial of hydroxychloroquine, however patient prefers to try more conservative measures first       Rheumatoid factor positive with cyclic citrullinated peptide (CCP) antibody negative  Currently low suspicion for rheumatoid arthritis.  Physical exam and history seems most consistent with osteoarthritis.  However, patient does have a strong family history of RA and thus counseled patient on symptoms to monitor for  Orders:    Ambulatory Referral to Rheumatology    RTC as needed       History of Present Illness     HPI  Araseli Shelton is a 64 y.o. female with a history of hypertension,  allergic rhinitis, asthma, hyperlipidemia who presents due to concerns of rheumatoid arthritis.    History obtained from: patient    Patient endorses worsening joint symptoms since September.  Reports that she is always dealt with osteoarthritis related pain but she started developing worsening symptoms.  Patient reports decreased  strength in her hands.  Reports 30 to 60 minutes of morning stiffness in her hands and lower back.  Patient denies any joint swelling.  Reports some days are worse than others.  Patient reports if she sits for too long she feels stiff for a few minutes however that improves after taking a few steps.  Patient takes Tylenol or NSAIDs as needed which does help with the pain.  Patient reports she has started eating more turmeric as well.    Family history: mom with RA and OA, grandfather with RA      Review of Systems  Complete ROS conducted as per HPI. In addition, denies:  Fever  Photosensitive rash  Sicca symptoms  Recurrent oral ulcers  Muscle weakness  Uveitis  Dactylitis  Chest pain  SOB  Pleurisy  Gross hematuria  Foamy urine  Raynaud's  Joint issues other than noted above    Medical History Reviewed by provider this encounter:  Tobacco  Allergies  Meds  Problems  Med Hx  Surg Hx  Fam Hx     .  Current Outpatient Medications on File Prior to Visit   Medication Sig Dispense Refill    albuterol (PROVENTIL HFA,VENTOLIN HFA) 90 mcg/act inhaler INHALE 2 PUFFS BY MOUTH 4 TIMES A DAY AS NEEDED FOR WHEEZING. 18 g 3    b complex vitamins capsule Take 1 capsule by mouth daily      Cholecalciferol (VITAMIN D3) 2000 units capsule Take 5,000 Units by mouth daily        fexofenadine (ALLEGRA) 60 MG tablet Take 1 tablet by mouth daily      hydroCHLOROthiazide 12.5 mg capsule TAKE 1 CAPSULE BY MOUTH EVERY DAY 90 capsule 1    mometasone 220 mcg/actuation inhaler Inhale 2 puffs daily Rinse mouth after use. 3 each 5    montelukast (SINGULAIR) 10 mg tablet TAKE 1 TABLET BY MOUTH DAILY AT  "BEDTIME 90 tablet 1    Omega-3 Fatty Acids (FISH OIL) 1,000 mg 1 capsule by Does not apply route daily      Multiple Vitamins-Minerals (OCUVITE PO) Take by mouth (Patient not taking: Reported on 11/25/2024)       No current facility-administered medications on file prior to visit.      Social History     Tobacco Use    Smoking status: Never    Smokeless tobacco: Never   Vaping Use    Vaping status: Never Used   Substance and Sexual Activity    Alcohol use: Yes     Alcohol/week: 1.0 standard drink of alcohol     Types: 1 Glasses of wine per week    Drug use: No    Sexual activity: Not on file        Objective   /80   Pulse 100   Temp 98.1 °F (36.7 °C)   Ht 5' 6.5\" (1.689 m)   Wt 99.8 kg (220 lb)   SpO2 96%   BMI 34.98 kg/m²      Physical Exam  General appearance: normal appearing, no acute distress  Skin: normal, no rashes  HEENT: normal, moist oropharynx, no nasal or oral ulcers  Lymph nodes: no palpable adenopathy  Lungs: normal respiratory effort, comfortable on room air, lungs clear to auscultation b/l   Heart: normal heart sounds, normal rate, normal rhythm,  Abdomen: soft, normal bowel sounds, no tenderness  Neurologic: no obvious neurological deficits   Extremities: no edema, warm and well perfused     Musculoskeletal Exam:   - Observation: Heberden's and Isabel nodes present  - Palpation: no joint tenderness  - Synovitis: absent  - Joint effusions: absent  - ROM: intact throughout  - Muscle Strength: 5/5 throughout       I have personally reviewed notes, labs, and imaging available in the chart.   RF 8  CCP 0.8  GIOVANNI negative  SSA negative  SSB negative    Iman Perez DO, CCD, MSUS  [facility]     "

## 2024-12-02 ENCOUNTER — VBI (OUTPATIENT)
Dept: ADMINISTRATIVE | Facility: OTHER | Age: 64
End: 2024-12-02

## 2024-12-02 NOTE — TELEPHONE ENCOUNTER
12/02/24 6:37 PM     Chart reviewed for   Cervical Cancer Screening    ; nothing is submitted to the patient's insurance at this time.     RACHELE GOMEZ MA   [facility]

## 2025-03-18 DIAGNOSIS — E78.2 MIXED HYPERLIPIDEMIA: ICD-10-CM

## 2025-03-18 DIAGNOSIS — I10 PRIMARY HYPERTENSION: Primary | ICD-10-CM

## 2025-03-19 ENCOUNTER — APPOINTMENT (OUTPATIENT)
Dept: LAB | Facility: CLINIC | Age: 65
End: 2025-03-19
Payer: COMMERCIAL

## 2025-03-19 LAB
ALBUMIN SERPL BCG-MCNC: 4 G/DL (ref 3.5–5)
ALP SERPL-CCNC: 54 U/L (ref 34–104)
ALT SERPL W P-5'-P-CCNC: 27 U/L (ref 7–52)
ANION GAP SERPL CALCULATED.3IONS-SCNC: 9 MMOL/L (ref 4–13)
AST SERPL W P-5'-P-CCNC: 25 U/L (ref 13–39)
BASOPHILS # BLD AUTO: 0.03 THOUSANDS/ÂΜL (ref 0–0.1)
BASOPHILS NFR BLD AUTO: 1 % (ref 0–1)
BILIRUB SERPL-MCNC: 0.57 MG/DL (ref 0.2–1)
BUN SERPL-MCNC: 20 MG/DL (ref 5–25)
CALCIUM SERPL-MCNC: 9.7 MG/DL (ref 8.4–10.2)
CHLORIDE SERPL-SCNC: 102 MMOL/L (ref 96–108)
CHOLEST SERPL-MCNC: 236 MG/DL (ref ?–200)
CO2 SERPL-SCNC: 29 MMOL/L (ref 21–32)
CREAT SERPL-MCNC: 0.81 MG/DL (ref 0.6–1.3)
EOSINOPHIL # BLD AUTO: 0.14 THOUSAND/ÂΜL (ref 0–0.61)
EOSINOPHIL NFR BLD AUTO: 2 % (ref 0–6)
ERYTHROCYTE [DISTWIDTH] IN BLOOD BY AUTOMATED COUNT: 14.7 % (ref 11.6–15.1)
GFR SERPL CREATININE-BSD FRML MDRD: 76 ML/MIN/1.73SQ M
GLUCOSE P FAST SERPL-MCNC: 87 MG/DL (ref 65–99)
HCT VFR BLD AUTO: 43.3 % (ref 34.8–46.1)
HDLC SERPL-MCNC: 51 MG/DL
HGB BLD-MCNC: 14.2 G/DL (ref 11.5–15.4)
IMM GRANULOCYTES # BLD AUTO: 0.02 THOUSAND/UL (ref 0–0.2)
IMM GRANULOCYTES NFR BLD AUTO: 0 % (ref 0–2)
LDLC SERPL CALC-MCNC: 137 MG/DL (ref 0–100)
LYMPHOCYTES # BLD AUTO: 2.05 THOUSANDS/ÂΜL (ref 0.6–4.47)
LYMPHOCYTES NFR BLD AUTO: 33 % (ref 14–44)
MCH RBC QN AUTO: 31.9 PG (ref 26.8–34.3)
MCHC RBC AUTO-ENTMCNC: 32.8 G/DL (ref 31.4–37.4)
MCV RBC AUTO: 97 FL (ref 82–98)
MONOCYTES # BLD AUTO: 0.53 THOUSAND/ÂΜL (ref 0.17–1.22)
MONOCYTES NFR BLD AUTO: 9 % (ref 4–12)
NEUTROPHILS # BLD AUTO: 3.41 THOUSANDS/ÂΜL (ref 1.85–7.62)
NEUTS SEG NFR BLD AUTO: 55 % (ref 43–75)
NONHDLC SERPL-MCNC: 185 MG/DL
NRBC BLD AUTO-RTO: 0 /100 WBCS
PLATELET # BLD AUTO: 333 THOUSANDS/UL (ref 149–390)
PMV BLD AUTO: 10.4 FL (ref 8.9–12.7)
POTASSIUM SERPL-SCNC: 4 MMOL/L (ref 3.5–5.3)
PROT SERPL-MCNC: 6.8 G/DL (ref 6.4–8.4)
RBC # BLD AUTO: 4.45 MILLION/UL (ref 3.81–5.12)
SODIUM SERPL-SCNC: 140 MMOL/L (ref 135–147)
TRIGL SERPL-MCNC: 240 MG/DL (ref ?–150)
WBC # BLD AUTO: 6.18 THOUSAND/UL (ref 4.31–10.16)

## 2025-03-19 PROCEDURE — 85025 COMPLETE CBC W/AUTO DIFF WBC: CPT | Performed by: FAMILY MEDICINE

## 2025-03-19 PROCEDURE — 80053 COMPREHEN METABOLIC PANEL: CPT | Performed by: FAMILY MEDICINE

## 2025-03-19 PROCEDURE — 80061 LIPID PANEL: CPT | Performed by: FAMILY MEDICINE

## 2025-03-19 PROCEDURE — 36415 COLL VENOUS BLD VENIPUNCTURE: CPT | Performed by: FAMILY MEDICINE

## 2025-03-23 DIAGNOSIS — J45.909 ASTHMA, UNSPECIFIED ASTHMA SEVERITY, UNSPECIFIED WHETHER COMPLICATED, UNSPECIFIED WHETHER PERSISTENT: ICD-10-CM

## 2025-03-23 DIAGNOSIS — I10 ESSENTIAL HYPERTENSION: ICD-10-CM

## 2025-03-24 RX ORDER — HYDROCHLOROTHIAZIDE 12.5 MG/1
12.5 CAPSULE ORAL DAILY
Qty: 90 CAPSULE | Refills: 1 | Status: SHIPPED | OUTPATIENT
Start: 2025-03-24

## 2025-03-24 RX ORDER — MONTELUKAST SODIUM 10 MG/1
10 TABLET ORAL
Qty: 90 TABLET | Refills: 1 | Status: SHIPPED | OUTPATIENT
Start: 2025-03-24

## 2025-03-26 ENCOUNTER — RA CDI HCC (OUTPATIENT)
Dept: OTHER | Facility: HOSPITAL | Age: 65
End: 2025-03-26

## 2025-03-26 NOTE — PROGRESS NOTES
HCC coding opportunities       Chart reviewed, no opportunity found: CHART REVIEWED, NO OPPORTUNITY FOUND   This is a reminder to address (resolve/update/assess) ALL HCC (risk adjustment) codes as found on active problem list for 2025 as patient scores reset to zero HAO.     Patients Insurance        Commercial Insurance: [employer]

## 2025-03-31 ENCOUNTER — OFFICE VISIT (OUTPATIENT)
Dept: FAMILY MEDICINE CLINIC | Facility: CLINIC | Age: 65
End: 2025-03-31
Payer: COMMERCIAL

## 2025-03-31 VITALS
WEIGHT: 219 LBS | HEIGHT: 67 IN | TEMPERATURE: 97.7 F | RESPIRATION RATE: 16 BRPM | HEART RATE: 72 BPM | BODY MASS INDEX: 34.37 KG/M2 | DIASTOLIC BLOOD PRESSURE: 72 MMHG | SYSTOLIC BLOOD PRESSURE: 116 MMHG | OXYGEN SATURATION: 95 %

## 2025-03-31 DIAGNOSIS — J45.40 MODERATE PERSISTENT ASTHMA WITHOUT COMPLICATION: ICD-10-CM

## 2025-03-31 DIAGNOSIS — E55.9 VITAMIN D DEFICIENCY: ICD-10-CM

## 2025-03-31 DIAGNOSIS — E78.2 MIXED HYPERLIPIDEMIA: ICD-10-CM

## 2025-03-31 DIAGNOSIS — I10 PRIMARY HYPERTENSION: Primary | ICD-10-CM

## 2025-03-31 PROCEDURE — 99214 OFFICE O/P EST MOD 30 MIN: CPT | Performed by: FAMILY MEDICINE

## 2025-03-31 NOTE — ASSESSMENT & PLAN NOTE
Hyperlipidemia mixed type on Omega 3 fatty acids 1 capsule/day. Not watching diet.       Lab Results   Component Value Date    CHOLESTEROL 236 (H) 03/19/2025    CHOLESTEROL 223 (H) 09/09/2024    CHOLESTEROL 225 (H) 03/18/2024     Lab Results   Component Value Date    HDL 51 03/19/2025    HDL 48 (L) 09/09/2024    HDL 51 03/18/2024     Lab Results   Component Value Date    TRIG 240 (H) 03/19/2025    TRIG 142 09/09/2024    TRIG 190 (H) 03/18/2024     Lab Results   Component Value Date    LDLCALC 137 (H) 03/19/2025     Lab Results   Component Value Date    [id number] 2.106 09/12/2023     Increase dose of Omega 3 fatty acids to 2/day.   Watch diet     Orders:    Lipid panel

## 2025-03-31 NOTE — ASSESSMENT & PLAN NOTE
Blood pressures have been stable on HCTZ 12.5 mg daily.     BP Readings from Last 3 Encounters:   03/31/25 116/72   11/25/24 128/80   09/23/24 124/74     Lab Results   Component Value Date    SODIUM 140 03/19/2025    K 4.0 03/19/2025     03/19/2025    CO2 29 03/19/2025    AGAP 9 03/19/2025    BUN 20 03/19/2025    CREATININE 0.81 03/19/2025    GLUF 87 03/19/2025    CALCIUM 9.7 03/19/2025    AST 25 03/19/2025    ALT 27 03/19/2025    ALKPHOS 54 03/19/2025    TP 6.8 03/19/2025    TBILI 0.57 03/19/2025    EGFR 76 03/19/2025     Lab Results   Component Value Date    WBC 6.18 03/19/2025    HGB 14.2 03/19/2025    HCT 43.3 03/19/2025    MCV 97 03/19/2025     03/19/2025         Orders:    CBC and differential    Comprehensive metabolic panel

## 2025-03-31 NOTE — PROGRESS NOTES
Name: Araseli Shelton      : 1960      MRN: [id number]  Encounter Provider: Butch Grace MD  Encounter Date: 3/31/2025   Encounter department: [facility]  :  Assessment & Plan  Primary hypertension    Blood pressures have been stable on HCTZ 12.5 mg daily.     BP Readings from Last 3 Encounters:   25 116/72   24 128/80   24 124/74     Lab Results   Component Value Date    SODIUM 140 2025    K 4.0 2025     2025    CO2 29 2025    AGAP 9 2025    BUN 20 2025    CREATININE 0.81 2025    GLUF 87 2025    CALCIUM 9.7 2025    AST 25 2025    ALT 27 2025    ALKPHOS 54 2025    TP 6.8 2025    TBILI 0.57 2025    EGFR 76 2025     Lab Results   Component Value Date    WBC 6.18 2025    HGB 14.2 2025    HCT 43.3 2025    MCV 97 2025     2025         Orders:    CBC and differential    Comprehensive metabolic panel      Mixed hyperlipidemia    Hyperlipidemia mixed type on Omega 3 fatty acids 1 capsule/day. Not watching diet.       Lab Results   Component Value Date    CHOLESTEROL 236 (H) 2025    CHOLESTEROL 223 (H) 2024    CHOLESTEROL 225 (H) 2024     Lab Results   Component Value Date    HDL 51 2025    HDL 48 (L) 2024    HDL 51 2024     Lab Results   Component Value Date    TRIG 240 (H) 2025    TRIG 142 2024    TRIG 190 (H) 2024     Lab Results   Component Value Date    LDLCALC 137 (H) 2025     Lab Results   Component Value Date    ICQ3RYNELPWL 2.106 2023     Increase dose of Omega 3 fatty acids to 2/day.   Watch diet     Orders:    Lipid panel      Moderate persistent asthma without complication    Asthma stable on daily Asmanex,Singulair 10 mg daily and ProAir once a day            Vitamin D deficiency    On Vitamin D 5,000 IU daily   2023 vitamin D level 38.9    Orders:    Vitamin D 25  hydroxy           History of Present Illness     CC Follow up visit for chronic medical problems.  Medications reviewed.          Review of Systems   Constitutional:  Negative for appetite change, chills, fatigue, fever and unexpected weight change.   HENT:  Negative for congestion, ear pain, rhinorrhea, sore throat and trouble swallowing.    Eyes:  Negative for visual disturbance.   Respiratory:  Negative for cough, shortness of breath and wheezing.    Cardiovascular:  Negative for chest pain, palpitations and leg swelling.   Gastrointestinal:  Negative for abdominal pain, blood in stool, constipation, diarrhea, nausea and vomiting.        No prior colonoscopy. 07/2021 Hep C Ab negative.    Endocrine:         2013 DEXA scan normal.    Genitourinary:  Negative for difficulty urinating.   Musculoskeletal:  Positive for back pain. Negative for arthralgias, myalgias and neck pain.        Chronic lower back pain.  09/2022 x rays lumbar spine Moderate lumbar levoscoliosis with advanced multilevel degenerative changes as described. Grade 1 spondylolisthesis L4 on L5 secondary to facet arthropathy.  She has using prn Tylenol/Advil/Bio Freeze for pain.    Skin:  Negative for rash.   Allergic/Immunologic: Positive for environmental allergies.        Seasonal allergies on Allegra daily.  No prior allergy testing   Neurological:  Negative for dizziness, weakness and headaches.   Hematological:  Negative for adenopathy. Does not bruise/bleed easily.        Lab Results       Component                Value               Date                       WBC                      6.18                03/19/2025                 HGB                      14.2                03/19/2025                 HCT                      43.3                03/19/2025                 MCV                      97                  03/19/2025                 PLT                      333                 03/19/2025                                              "  Psychiatric/Behavioral:  Negative for dysphoric mood and sleep disturbance.        Objective   /72 (BP Location: Left arm, Patient Position: Sitting, Cuff Size: Large)   Pulse 72   Temp 97.7 °F (36.5 °C) (Temporal)   Resp 16   Ht 5' 6.5\" (1.689 m)   Wt 99.3 kg (219 lb)   SpO2 95%   BMI 34.82 kg/m²      Wt Readings from Last 3 Encounters:   03/31/25 99.3 kg (219 lb)   11/25/24 99.8 kg (220 lb)   09/23/24 98.9 kg (218 lb)        Physical Exam  Constitutional:       General: She is not in acute distress.  Eyes:      General: No scleral icterus.     Conjunctiva/sclera: Conjunctivae normal.   Neck:      Thyroid: No thyroid mass or thyromegaly.      Vascular: Normal carotid pulses. No carotid bruit.   Cardiovascular:      Rate and Rhythm: Normal rate and regular rhythm.      Heart sounds: No murmur heard.     No gallop.   Pulmonary:      Effort: Pulmonary effort is normal.      Breath sounds: Normal breath sounds. No wheezing or rales.   Musculoskeletal:      Right lower leg: No edema.      Left lower leg: No edema.   Lymphadenopathy:      Cervical: No cervical adenopathy.      Upper Body:      Right upper body: No supraclavicular adenopathy.      Left upper body: No supraclavicular adenopathy.   Skin:     Coloration: Skin is not cyanotic.      Findings: No rash.      Nails: There is no clubbing.   Neurological:      General: No focal deficit present.      Mental Status: She is alert and oriented to person, place, and time.   Psychiatric:         Mood and Affect: Mood normal.         "

## 2025-05-19 DIAGNOSIS — J45.909 UNCOMPLICATED ASTHMA, UNSPECIFIED ASTHMA SEVERITY, UNSPECIFIED WHETHER PERSISTENT: ICD-10-CM

## 2025-05-19 DIAGNOSIS — J45.40 MODERATE PERSISTENT ASTHMA WITHOUT COMPLICATION: ICD-10-CM

## 2025-05-19 RX ORDER — ALBUTEROL SULFATE 90 UG/1
2 INHALANT RESPIRATORY (INHALATION) 4 TIMES DAILY
Qty: 18 G | Refills: 5 | Status: SHIPPED | OUTPATIENT
Start: 2025-05-19

## 2025-05-27 ENCOUNTER — VBI (OUTPATIENT)
Dept: ADMINISTRATIVE | Facility: OTHER | Age: 65
End: 2025-05-27

## 2025-05-27 NOTE — TELEPHONE ENCOUNTER
05/27/25 1:15 PM     Chart reviewed for Mammogram ; nothing is submitted to the patient's insurance at this time.     Liliana Richardson MA   [facility]

## 2025-07-30 ENCOUNTER — VBI (OUTPATIENT)
Dept: ADMINISTRATIVE | Facility: OTHER | Age: 65
End: 2025-07-30